# Patient Record
Sex: FEMALE | Race: BLACK OR AFRICAN AMERICAN | NOT HISPANIC OR LATINO | URBAN - METROPOLITAN AREA
[De-identification: names, ages, dates, MRNs, and addresses within clinical notes are randomized per-mention and may not be internally consistent; named-entity substitution may affect disease eponyms.]

---

## 2021-07-01 ENCOUNTER — INPATIENT (INPATIENT)
Facility: HOSPITAL | Age: 71
LOS: 7 days | Discharge: HOME HEALTH | DRG: 191 | End: 2021-07-09
Attending: INTERNAL MEDICINE | Admitting: INTERNAL MEDICINE
Payer: MEDICARE

## 2021-07-01 VITALS
RESPIRATION RATE: 18 BRPM | DIASTOLIC BLOOD PRESSURE: 78 MMHG | HEIGHT: 66 IN | WEIGHT: 108.03 LBS | TEMPERATURE: 99 F | SYSTOLIC BLOOD PRESSURE: 142 MMHG | HEART RATE: 105 BPM | OXYGEN SATURATION: 97 %

## 2021-07-01 DIAGNOSIS — J43.9 EMPHYSEMA, UNSPECIFIED: ICD-10-CM

## 2021-07-01 DIAGNOSIS — J44.1 CHRONIC OBSTRUCTIVE PULMONARY DISEASE WITH (ACUTE) EXACERBATION: ICD-10-CM

## 2021-07-01 DIAGNOSIS — Z29.9 ENCOUNTER FOR PROPHYLACTIC MEASURES, UNSPECIFIED: ICD-10-CM

## 2021-07-01 DIAGNOSIS — R60.0 LOCALIZED EDEMA: ICD-10-CM

## 2021-07-01 DIAGNOSIS — R25.1 TREMOR, UNSPECIFIED: ICD-10-CM

## 2021-07-01 DIAGNOSIS — R07.9 CHEST PAIN, UNSPECIFIED: ICD-10-CM

## 2021-07-01 DIAGNOSIS — R91.1 SOLITARY PULMONARY NODULE: ICD-10-CM

## 2021-07-01 DIAGNOSIS — R07.1 CHEST PAIN ON BREATHING: ICD-10-CM

## 2021-07-01 LAB
ALBUMIN SERPL ELPH-MCNC: 4.4 G/DL — SIGNIFICANT CHANGE UP (ref 3.3–5)
ALP SERPL-CCNC: 132 U/L — HIGH (ref 40–120)
ALT FLD-CCNC: 75 U/L — HIGH (ref 10–45)
ANION GAP SERPL CALC-SCNC: 12 MMOL/L — SIGNIFICANT CHANGE UP (ref 5–17)
AST SERPL-CCNC: 66 U/L — HIGH (ref 10–40)
BASE EXCESS BLDA CALC-SCNC: -1.3 MMOL/L — SIGNIFICANT CHANGE UP (ref -2–2)
BASE EXCESS BLDV CALC-SCNC: 0.4 MMOL/L — SIGNIFICANT CHANGE UP (ref -2–2)
BASE EXCESS BLDV CALC-SCNC: 1.8 MMOL/L — SIGNIFICANT CHANGE UP (ref -2–2)
BASOPHILS # BLD AUTO: 0.02 K/UL — SIGNIFICANT CHANGE UP (ref 0–0.2)
BASOPHILS NFR BLD AUTO: 0.3 % — SIGNIFICANT CHANGE UP (ref 0–2)
BILIRUB SERPL-MCNC: 0.6 MG/DL — SIGNIFICANT CHANGE UP (ref 0.2–1.2)
BUN SERPL-MCNC: 9 MG/DL — SIGNIFICANT CHANGE UP (ref 7–23)
CA-I SERPL-SCNC: 1.24 MMOL/L — SIGNIFICANT CHANGE UP (ref 1.12–1.3)
CA-I SERPL-SCNC: 1.31 MMOL/L — HIGH (ref 1.12–1.3)
CALCIUM SERPL-MCNC: 10 MG/DL — SIGNIFICANT CHANGE UP (ref 8.4–10.5)
CHLORIDE BLDV-SCNC: 106 MMOL/L — SIGNIFICANT CHANGE UP (ref 96–108)
CHLORIDE BLDV-SCNC: 110 MMOL/L — HIGH (ref 96–108)
CHLORIDE SERPL-SCNC: 101 MMOL/L — SIGNIFICANT CHANGE UP (ref 96–108)
CO2 BLDA-SCNC: 26 MMOL/L — SIGNIFICANT CHANGE UP (ref 22–30)
CO2 BLDV-SCNC: 29 MMOL/L — SIGNIFICANT CHANGE UP (ref 22–30)
CO2 BLDV-SCNC: 32 MMOL/L — HIGH (ref 22–30)
CO2 SERPL-SCNC: 26 MMOL/L — SIGNIFICANT CHANGE UP (ref 22–31)
CREAT SERPL-MCNC: 0.67 MG/DL — SIGNIFICANT CHANGE UP (ref 0.5–1.3)
EOSINOPHIL # BLD AUTO: 0.09 K/UL — SIGNIFICANT CHANGE UP (ref 0–0.5)
EOSINOPHIL NFR BLD AUTO: 1.2 % — SIGNIFICANT CHANGE UP (ref 0–6)
GAS PNL BLDV: 140 MMOL/L — SIGNIFICANT CHANGE UP (ref 135–145)
GAS PNL BLDV: 143 MMOL/L — SIGNIFICANT CHANGE UP (ref 135–145)
GAS PNL BLDV: SIGNIFICANT CHANGE UP
GAS PNL BLDV: SIGNIFICANT CHANGE UP
GLUCOSE BLDV-MCNC: 133 MG/DL — HIGH (ref 70–99)
GLUCOSE BLDV-MCNC: 142 MG/DL — HIGH (ref 70–99)
GLUCOSE SERPL-MCNC: 119 MG/DL — HIGH (ref 70–99)
HCO3 BLDA-SCNC: 24 MMOL/L — SIGNIFICANT CHANGE UP (ref 21–29)
HCO3 BLDV-SCNC: 27 MMOL/L — SIGNIFICANT CHANGE UP (ref 21–29)
HCO3 BLDV-SCNC: 30 MMOL/L — HIGH (ref 21–29)
HCT VFR BLD CALC: 44.3 % — SIGNIFICANT CHANGE UP (ref 34.5–45)
HCT VFR BLDA CALC: 39 % — SIGNIFICANT CHANGE UP (ref 39–50)
HCT VFR BLDA CALC: 48 % — SIGNIFICANT CHANGE UP (ref 39–50)
HGB BLD CALC-MCNC: 12.8 G/DL — SIGNIFICANT CHANGE UP (ref 11.5–15.5)
HGB BLD CALC-MCNC: 15.5 G/DL — SIGNIFICANT CHANGE UP (ref 11.5–15.5)
HGB BLD-MCNC: 13.8 G/DL — SIGNIFICANT CHANGE UP (ref 11.5–15.5)
IMM GRANULOCYTES NFR BLD AUTO: 0.1 % — SIGNIFICANT CHANGE UP (ref 0–1.5)
LACTATE BLDV-MCNC: 1.4 MMOL/L — SIGNIFICANT CHANGE UP (ref 0.7–2)
LACTATE BLDV-MCNC: 3.8 MMOL/L — HIGH (ref 0.7–2)
LYMPHOCYTES # BLD AUTO: 2.04 K/UL — SIGNIFICANT CHANGE UP (ref 1–3.3)
LYMPHOCYTES # BLD AUTO: 26.9 % — SIGNIFICANT CHANGE UP (ref 13–44)
MCHC RBC-ENTMCNC: 31.2 GM/DL — LOW (ref 32–36)
MCHC RBC-ENTMCNC: 31.7 PG — SIGNIFICANT CHANGE UP (ref 27–34)
MCV RBC AUTO: 101.8 FL — HIGH (ref 80–100)
MONOCYTES # BLD AUTO: 0.58 K/UL — SIGNIFICANT CHANGE UP (ref 0–0.9)
MONOCYTES NFR BLD AUTO: 7.7 % — SIGNIFICANT CHANGE UP (ref 2–14)
NEUTROPHILS # BLD AUTO: 4.84 K/UL — SIGNIFICANT CHANGE UP (ref 1.8–7.4)
NEUTROPHILS NFR BLD AUTO: 63.8 % — SIGNIFICANT CHANGE UP (ref 43–77)
NRBC # BLD: 0 /100 WBCS — SIGNIFICANT CHANGE UP (ref 0–0)
PCO2 BLDA: 45 MMHG — SIGNIFICANT CHANGE UP (ref 32–46)
PCO2 BLDV: 54 MMHG — HIGH (ref 35–50)
PCO2 BLDV: 67 MMHG — HIGH (ref 35–50)
PH BLDA: 7.34 — LOW (ref 7.35–7.45)
PH BLDV: 7.28 — LOW (ref 7.35–7.45)
PH BLDV: 7.32 — LOW (ref 7.35–7.45)
PLATELET # BLD AUTO: 219 K/UL — SIGNIFICANT CHANGE UP (ref 150–400)
PO2 BLDA: 121 MMHG — HIGH (ref 74–108)
PO2 BLDV: 22 MMHG — LOW (ref 25–45)
PO2 BLDV: 48 MMHG — HIGH (ref 25–45)
POTASSIUM BLDV-SCNC: 4 MMOL/L — SIGNIFICANT CHANGE UP (ref 3.5–5.3)
POTASSIUM BLDV-SCNC: 4 MMOL/L — SIGNIFICANT CHANGE UP (ref 3.5–5.3)
POTASSIUM SERPL-MCNC: 4.2 MMOL/L — SIGNIFICANT CHANGE UP (ref 3.5–5.3)
POTASSIUM SERPL-SCNC: 4.2 MMOL/L — SIGNIFICANT CHANGE UP (ref 3.5–5.3)
PROT SERPL-MCNC: 10.1 G/DL — HIGH (ref 6–8.3)
RAPID RVP RESULT: SIGNIFICANT CHANGE UP
RBC # BLD: 4.35 M/UL — SIGNIFICANT CHANGE UP (ref 3.8–5.2)
RBC # FLD: 13.1 % — SIGNIFICANT CHANGE UP (ref 10.3–14.5)
SAO2 % BLDA: 98 % — HIGH (ref 92–96)
SAO2 % BLDV: 23 % — LOW (ref 67–88)
SAO2 % BLDV: 80 % — SIGNIFICANT CHANGE UP (ref 67–88)
SARS-COV-2 RNA SPEC QL NAA+PROBE: SIGNIFICANT CHANGE UP
SARS-COV-2 RNA SPEC QL NAA+PROBE: SIGNIFICANT CHANGE UP
SODIUM SERPL-SCNC: 144 MMOL/L — SIGNIFICANT CHANGE UP (ref 135–145)
WBC # BLD: 7.58 K/UL — SIGNIFICANT CHANGE UP (ref 3.8–10.5)
WBC # FLD AUTO: 7.58 K/UL — SIGNIFICANT CHANGE UP (ref 3.8–10.5)

## 2021-07-01 PROCEDURE — 99285 EMERGENCY DEPT VISIT HI MDM: CPT | Mod: 25

## 2021-07-01 PROCEDURE — 99223 1ST HOSP IP/OBS HIGH 75: CPT

## 2021-07-01 PROCEDURE — 71275 CT ANGIOGRAPHY CHEST: CPT | Mod: 26

## 2021-07-01 PROCEDURE — 93010 ELECTROCARDIOGRAM REPORT: CPT

## 2021-07-01 PROCEDURE — 71045 X-RAY EXAM CHEST 1 VIEW: CPT | Mod: 26

## 2021-07-01 RX ORDER — IPRATROPIUM/ALBUTEROL SULFATE 18-103MCG
3 AEROSOL WITH ADAPTER (GRAM) INHALATION EVERY 6 HOURS
Refills: 0 | Status: DISCONTINUED | OUTPATIENT
Start: 2021-07-01 | End: 2021-07-09

## 2021-07-01 RX ORDER — BUDESONIDE AND FORMOTEROL FUMARATE DIHYDRATE 160; 4.5 UG/1; UG/1
2 AEROSOL RESPIRATORY (INHALATION)
Refills: 0 | Status: DISCONTINUED | OUTPATIENT
Start: 2021-07-01 | End: 2021-07-09

## 2021-07-01 RX ORDER — ENOXAPARIN SODIUM 100 MG/ML
40 INJECTION SUBCUTANEOUS DAILY
Refills: 0 | Status: DISCONTINUED | OUTPATIENT
Start: 2021-07-01 | End: 2021-07-09

## 2021-07-01 RX ORDER — TIOTROPIUM BROMIDE 18 UG/1
1 CAPSULE ORAL; RESPIRATORY (INHALATION) DAILY
Refills: 0 | Status: DISCONTINUED | OUTPATIENT
Start: 2021-07-01 | End: 2021-07-09

## 2021-07-01 RX ORDER — ALBUTEROL 90 UG/1
3 AEROSOL, METERED ORAL
Qty: 0 | Refills: 0 | DISCHARGE

## 2021-07-01 RX ORDER — SODIUM CHLORIDE 9 MG/ML
1000 INJECTION INTRAMUSCULAR; INTRAVENOUS; SUBCUTANEOUS ONCE
Refills: 0 | Status: COMPLETED | OUTPATIENT
Start: 2021-07-01 | End: 2021-07-01

## 2021-07-01 RX ORDER — CEFEPIME 1 G/1
1000 INJECTION, POWDER, FOR SOLUTION INTRAMUSCULAR; INTRAVENOUS ONCE
Refills: 0 | Status: COMPLETED | OUTPATIENT
Start: 2021-07-01 | End: 2021-07-01

## 2021-07-01 RX ADMIN — SODIUM CHLORIDE 1000 MILLILITER(S): 9 INJECTION INTRAMUSCULAR; INTRAVENOUS; SUBCUTANEOUS at 12:49

## 2021-07-01 RX ADMIN — BUDESONIDE AND FORMOTEROL FUMARATE DIHYDRATE 2 PUFF(S): 160; 4.5 AEROSOL RESPIRATORY (INHALATION) at 17:34

## 2021-07-01 RX ADMIN — CEFEPIME 100 MILLIGRAM(S): 1 INJECTION, POWDER, FOR SOLUTION INTRAMUSCULAR; INTRAVENOUS at 13:52

## 2021-07-01 RX ADMIN — Medication 3 MILLILITER(S): at 17:34

## 2021-07-01 RX ADMIN — SODIUM CHLORIDE 1000 MILLILITER(S): 9 INJECTION INTRAMUSCULAR; INTRAVENOUS; SUBCUTANEOUS at 13:49

## 2021-07-01 RX ADMIN — CEFEPIME 1000 MILLIGRAM(S): 1 INJECTION, POWDER, FOR SOLUTION INTRAMUSCULAR; INTRAVENOUS at 14:22

## 2021-07-01 RX ADMIN — Medication 125 MILLIGRAM(S): at 13:52

## 2021-07-01 NOTE — ED ADULT NURSE NOTE - OBJECTIVE STATEMENT
72 y/o female brought in by EMS with chest pain, A & Ox3. Pt verbalizes "waking up from sleep with chest tightness spreading to left side of chest." Pt states she took baby aspirin chewable at onset of symptoms with minimal relief. Pt states she has been feeling more lethargic and is having intermittent chills. Pt denies palpitations, diaphoresis, n/v/d, dizziness, changes in vision, syncope, or changes in appetite. Upon assessment, A&Ox3 gross neuro intact, lungs cta bilaterally, tachypneic and dyspneic on exertion, abdomen soft nontender, nondistended.  Pt. with past medical history of COPD, asthma, former smoker. Pt on 3L oxygen nasal canula at home. Pt. placed on cardiac monitor, EKG Completed, labs drawn. safety checks completed, side rails in place, call bell within reach. Awaiting results. 72 y/o female brought in by EMS with chest pain, A & Ox3. Pt verbalizes "waking up from sleep with chest tightness spreading to left side of chest." Pt states she took baby aspirin chewable at onset of symptoms with minimal relief. Pt states she has been feeling more lethargic, having intermittent chills, and chest pressure worsens with exertion. Pt denies palpitations, diaphoresis, n/v/d, dizziness, changes in vision, syncope, or changes in appetite. Upon assessment, A&Ox3 gross neuro intact, lungs cta bilaterally, tachypneic and dyspneic on exertion, abdomen soft nontender, nondistended.  Pt. with past medical history of COPD, asthma, former smoker. Pt on 3L oxygen nasal canula at home. Pt. placed on cardiac monitor, EKG Completed, labs drawn. safety checks completed, side rails in place, call bell within reach. Awaiting results.

## 2021-07-01 NOTE — CONSULT NOTE ADULT - SUBJECTIVE AND OBJECTIVE BOX
PULMONARY CONSULT    HPI: 69 y/o F with PMH emphysema/COPD, asthma (on 3LNC). Presents with sharp L sided CP worse with inhalation and worsening SOB. States has not felt well for the past week with increased temps.       PAST MEDICAL & SURGICAL HISTORY:  Personal History of Chronic Obstructive Pulmonary Disease   Personal History of Asthma   Personal History of Pneumonia   No significant past surgical history    Allergies  aspirin (Unknown)  eggs (Unknown)    FAMILY HISTORY:  No pertinent family history in first degree relatives    Social history:     Review of Systems:  CONSTITUTIONAL: Per above   EYES: No eye pain, visual disturbances, or discharge  ENMT:  No difficulty hearing, tinnitus, vertigo; No sinus or throat pain  NECK: No pain or stiffness  RESPIRATORY: Per above  CARDIOVASCULAR: No chest pain, palpitations, dizziness, or leg swelling  GASTROINTESTINAL: No abdominal or epigastric pain. No nausea, vomiting, or hematemesis; No diarrhea or constipation. No melena or hematochezia.  GENITOURINARY: No dysuria, frequency, hematuria, or incontinence  NEUROLOGICAL: No headaches, memory loss, loss of strength, numbness, or tremors  SKIN: No itching, burning, rashes, or lesions   MUSCULOSKELETAL: No joint pain or swelling; No muscle, back, or extremity pain  PSYCHIATRIC: No depression, anxiety, mood swings, or difficulty sleeping    Medications:  MEDICATIONS  (STANDING):      Vital Signs Last 24 Hrs  T(C): 37.1 (01 Jul 2021 12:16), Max: 37.1 (01 Jul 2021 11:48)  T(F): 98.7 (01 Jul 2021 12:16), Max: 98.7 (01 Jul 2021 11:48)  HR: 109 (01 Jul 2021 12:50) (105 - 109)  BP: 155/92 (01 Jul 2021 12:50) (142/78 - 172/104)  BP(mean): 109 (01 Jul 2021 12:50) (109 - 109)  RR: 26 (01 Jul 2021 12:16) (18 - 26)  SpO2: 97% (01 Jul 2021 12:16) (97% - 97%)      VBG pH 7.28 07-01 @ 12:37  VBG pCO2 67 07-01 @ 12:37  VBG O2 sat 23 07-01 @ 12:37  VBG lactate 3.8 07-01 @ 12:37      LABS:                        13.8   7.58  )-----------( 219      ( 01 Jul 2021 12:36 )             44.3     07-01    144  |  101  |  9   ----------------------------<  119<H>  4.2   |  26  |  0.67    Ca    10.0      01 Jul 2021 12:36    TPro  10.1<H>  /  Alb  4.4  /  TBili  0.6  /  DBili  x   /  AST  66<H>  /  ALT  75<H>  /  AlkPhos  132<H>  07-01          Serum Pro-Brain Natriuretic Peptide: 51 pg/mL (07-01-21 @ 12:36)        Physical Examination:    General: No acute distress.      HEENT: Pupils equal, reactive to light.  Symmetric.    PULM: Clear to auscultation bilaterally, no significant sputum production    CVS: S1, S2    ABD: Soft, nondistended, nontender, normoactive bowel sounds, no masses    EXT: No edema, nontender    SKIN: Warm and well perfused, no rashes noted.    NEURO: Alert, oriented, interactive, nonfocal      RADIOLOGY REVIEWED  CXR: BLANCA scarring  emphysema  PULMONARY CONSULT    HPI: 71 y/o F with PMH emphysema/COPD, asthma (on 3LNC). Presents with sharp L sided CP worse with inhalation and worsening SOB. States has not felt well for the past week, endorses L knee swelling, which has now improved. CXR with no clear infiltrate. S/p solumedrol 125mg IVP x1 in ED. No wheezing on exam, at baseline O2 requirements. Denies fevers.       PAST MEDICAL & SURGICAL HISTORY:  Personal History of Chronic Obstructive Pulmonary Disease   Personal History of Asthma   Personal History of Pneumonia   No significant past surgical history    Allergies  aspirin (Unknown)  eggs (Unknown)    FAMILY HISTORY:  No pertinent family history in first degree relatives    Social history:     Review of Systems:  CONSTITUTIONAL: Per above   EYES: No eye pain, visual disturbances, or discharge  ENMT:  No difficulty hearing, tinnitus, vertigo; No sinus or throat pain  NECK: No pain or stiffness  RESPIRATORY: Per above  CARDIOVASCULAR: Per above   GASTROINTESTINAL: No abdominal or epigastric pain. No nausea, vomiting, or hematemesis; No diarrhea or constipation. No melena or hematochezia.  GENITOURINARY: No dysuria, frequency, hematuria, or incontinence  NEUROLOGICAL: No headaches, memory loss, loss of strength, numbness, or tremors  SKIN: No itching, burning, rashes, or lesions   MUSCULOSKELETAL: No joint pain or swelling; No muscle, back, or extremity pain  PSYCHIATRIC: No depression, anxiety, mood swings, or difficulty sleeping    Medications:  MEDICATIONS  (STANDING):      Vital Signs Last 24 Hrs  T(C): 37.1 (01 Jul 2021 12:16), Max: 37.1 (01 Jul 2021 11:48)  T(F): 98.7 (01 Jul 2021 12:16), Max: 98.7 (01 Jul 2021 11:48)  HR: 109 (01 Jul 2021 12:50) (105 - 109)  BP: 155/92 (01 Jul 2021 12:50) (142/78 - 172/104)  BP(mean): 109 (01 Jul 2021 12:50) (109 - 109)  RR: 26 (01 Jul 2021 12:16) (18 - 26)  SpO2: 97% (01 Jul 2021 12:16) (97% - 97%)      VBG pH 7.28 07-01 @ 12:37  VBG pCO2 67 07-01 @ 12:37  VBG O2 sat 23 07-01 @ 12:37  VBG lactate 3.8 07-01 @ 12:37      LABS:                        13.8   7.58  )-----------( 219      ( 01 Jul 2021 12:36 )             44.3     07-01    144  |  101  |  9   ----------------------------<  119<H>  4.2   |  26  |  0.67    Ca    10.0      01 Jul 2021 12:36    TPro  10.1<H>  /  Alb  4.4  /  TBili  0.6  /  DBili  x   /  AST  66<H>  /  ALT  75<H>  /  AlkPhos  132<H>  07-01          Serum Pro-Brain Natriuretic Peptide: 51 pg/mL (07-01-21 @ 12:36)        Physical Examination:    General: No acute distress.      HEENT: Pupils equal, reactive to light.  Symmetric.    PULM: decreased BS    CVS: S1, S2    ABD: Soft, nondistended, nontender, normoactive bowel sounds, no masses    EXT: No edema, nontender    SKIN: Warm and well perfused, no rashes noted.    NEURO: Alert, oriented, interactive, nonfocal      RADIOLOGY REVIEWED  CXR: BLANCA scarring  emphysema

## 2021-07-01 NOTE — ED PROVIDER NOTE - PMH
Personal History of Asthma (ICD9 V12.69)    Personal History of Chronic Obstructive Pulmonary Disease (ICD9 V12.69)    Personal History of Pneumonia (ICD9 V12.61)

## 2021-07-01 NOTE — ED PROVIDER NOTE - ATTENDING CONTRIBUTION TO CARE
Private Physician Kevan Barber Not staff. Mingo Browning MD,  Pulmonary  [place]  70y feamle pmh copd/asthma, Former smoker, No dm,htn,hld,cad, pt comes to ed c/o cp/sob osnet today. Pain left side sharp. Pt has not felt well for past week with increased temps, NO abd pain, nvdc, fever and chills, Pt normally on 3lpm nc, has unspecific weight loss past week. PE Elderly female looking stated age thin/mild dyspenic. Heent normocephalic atraumatic neck supple chest clear anterior & posterior cv no rubs, gallops or murmurs abd soft +bs no mass guarding neruo gcs 15 speech fluent power 5/5 all extr  Georges Avalos MD, Facep

## 2021-07-01 NOTE — H&P ADULT - NSICDXPASTMEDICALHX_GEN_ALL_CORE_FT
PAST MEDICAL HISTORY:  Personal History of Asthma (ICD9 V12.69)     Personal History of Chronic Obstructive Pulmonary Disease (ICD9 V12.69)     Personal History of Pneumonia (ICD9 V12.61)

## 2021-07-01 NOTE — H&P ADULT - PROBLEM SELECTOR PLAN 1
- does not appear to be in exacerbation  - no wheezing on exam ; she does appear to be short of breath  -s/p solumedrol 125mg IVP x1 in ED. Would hold off on further steroids per Pul reccs  -c/w O2 2-3LNC (baseline 3LNC)  -Symbicort 160mcg/4.5mcg 2 puffs BID  -Spiriva 18mcg 1 capsule inhaled qd  -Duoneb q6h  -f/up ABG  - CTA  negative for PE but shows a new spiculated RUL mass - does not appear to be in exacerbation  - no wheezing on exam ; she does appear to be short of breath  -s/p solumedrol 125mg IVP x1 in ED. Would hold off on further steroids per Pulm reccs  -c/w O2 2-3LNC (baseline 3LNC)  -Symbicort 160mcg/4.5mcg 2 puffs BID  -Spiriva 18mcg 1 capsule inhaled qd  -Duoneb q6h  -f/up ABG  - CTA  negative for PE but shows a new spiculated RUL mass

## 2021-07-01 NOTE — ED PROVIDER NOTE - NS ED ROS FT
General: denies fever, chills  HENT: denies nasal congestion, rhinorrhea  Eyes: denies visual changes, blurred vision  CV: positive chest pain, no palpitations  Resp: positive difficulty breathing, cough  Abdominal: denies nausea, vomiting, diarrhea, abdominal pain  : denies urinary pain or discharge  MSK: denies muscle aches, leg swelling  Neuro: denies headaches, numbness, tingling  Skin: denies rashes, bruises

## 2021-07-01 NOTE — H&P ADULT - HISTORY OF PRESENT ILLNESS
70F with h/o emphysema/COPD, asthma (on home O2 3LNC) who presents with c/o sharp L sided CP worse with inhalation and worsening SOB. She states that she has not been feeling well  for the past week. She reports left knee swelling, which has now improved. She denies cough, wheezing, fever/chills.     ED COURSE  VS :  70F with h/o emphysema/COPD, asthma (on home O2 3LNC) who presents with c/o sharp L sided CP worse with inhalation and worsening SOB. She states that she has not been feeling well  for the past week. She reports left knee swelling, which has now improved. She denies cough, wheezing, fever/chills.     ED COURSE  VS : 142/78  105  18  o2 97%3L T 98.7F  Labs : no leukocytosis, bun/cr 9/0.67 /AST 66/ALT 75  Treatment ; Cefepime 1g IVPB  x 1, IVF NS 1L x 1, Solumedrol 125mg IVP x1 70F with h/o emphysema/COPD, Asthma (on home O2 3LNC) who presents with c/o sharp Left sided CP worse with inhalation and worsening SOB. She states that she has not been feeling well  for the past week. She reports left knee swelling, which has now improved. She denies cough, wheezing, fever/chills.     ED COURSE  VS : 142/78  105  18  o2 97%3L T 98.7F  Labs : no leukocytosis, bun/cr 9/0.67 /AST 66/ALT 75  Treatment ; Cefepime 1g IVPB  x 1, IVF NS 1L x 1, Solumedrol 125mg IVP x1 70F with h/o emphysema/COPD, Asthma (on home O2 3LNC) who presents with c/o sharp left sided CP worse with inhalation and worsening SOB. She states that she has not been feeling well  for the past week. She reports left knee swelling, which has now improved. She denies cough, wheezing, fever/chills.     ED COURSE  VS : 142/78  105  18  o2 97%3L T 98.7F  Labs : no leukocytosis, bun/cr 9/0.67 /AST 66/ALT 75  Treatment ; Cefepime 1g IVPB  x 1, IVF NS 1L x 1, Solumedrol 125mg IVP x1 70F with h/o emphysema/COPD, Asthma (on home O2 3LNC) who presents with c/o sharp left sided CP worse with inhalation and worsening SOB. She states that she has not been feeling well  for the past week. She reports left knee swelling, which has now improved. She denies cough, wheezing, fever/chills.     ED COURSE  VS : 142/78  105  18  O2 97%3L T 98.7F  Labs : no leukocytosis, bun/cr 9/0.67 /AST 66/ALT 75  Treatment ; Cefepime 1g IVPB  x 1, IVF NS 1L x 1, Solumedrol 125mg IVP x1

## 2021-07-01 NOTE — H&P ADULT - PROBLEM SELECTOR PLAN 3
- CTA shows new 2.1 x 1.2 cm spiculated nodule in the right upper lobe ; concerning for malignancy  - Pulm f/up for further work up re: biopsy etc

## 2021-07-01 NOTE — ED PROVIDER NOTE - OBJECTIVE STATEMENT
70y feamle pmh copd/asthma on 3L home O2 coming in w/ sharp left sided chest pain worse w/ inhalation and worsened SOB. Pt has not felt well for past week with increased temps. Was scheduled to see her pulmonologist  (Mingo Browning) today but instead came to the ED for pain.

## 2021-07-01 NOTE — CONSULT NOTE ADULT - ASSESSMENT
69 y/o F with PMH emphysema/COPD, asthma (on 3LNC). Presents with sharp L sided CP worse with inhalation and worsening SOB. States has not felt well for the past week, endorses L knee swelling, which has now improved. CXR with no clear infiltrate. S/p solumedrol 125mg IVP x1 in ED. No wheezing on exam, at baseline O2 requirements.

## 2021-07-01 NOTE — ED PROVIDER NOTE - CLINICAL SUMMARY MEDICAL DECISION MAKING FREE TEXT BOX
70y F pmh copd/asthma on 3L home O2 coming in w/ sharp left sided chest pain worse w/ inhalation and worsened SOB; likely COPD exacerbation vs pneumonia  Plan: labs + cxr; likely TBA

## 2021-07-01 NOTE — H&P ADULT - NSHPLABSRESULTS_GEN_ALL_CORE
Labs reviewed : no leukocytosis, elevated LFTs    CXR  personally reviewed : hyperinflated lungs, no effusions, no focal infilterate Labs reviewed : no leukocytosis, elevated LFTs    CXR  personally reviewed : hyperinflated lungs, no effusions, no focal infilterate    CTA chest : No pulmonary embolus.  New 2.1 x 1.2 cm spiculated nodule in the right upper lobe when compared with 2016 examination. This finding is indeterminate but concerning for malignancy given the concurrent finding of severe emphysema. No associated lymphadenopathy.

## 2021-07-01 NOTE — H&P ADULT - PROBLEM SELECTOR PLAN 2
- worse with inspiration  - CTA chest negative for PE but shows a new spiculated RUL mass ; concerning for malignancy  - f/up Pulm re: further work up  - will start Tylenol prn for pain

## 2021-07-01 NOTE — H&P ADULT - NSHPSOCIALHISTORY_GEN_ALL_CORE
Denies smoking, Etoh abuse, IVDU Denies smoking, Etoh abuse, IVDU  Quit smoking 2 years ago  Drinks Etoh occasionally

## 2021-07-01 NOTE — CONSULT NOTE ADULT - PROBLEM SELECTOR RECOMMENDATION 9
?exacerbation - not noted to be wheezing on admission or on exam, +SOB noted  -S/p solumedrol 125mg IVP x1 in ED. Would hold off on further steroids and re-evaluate tomorrow.   -C/w O2 2-3LNC (baseline 3LNC)  -Symbicort 160mcg/4.5mcg 2 puffs BID  -Spiriva 18mcg 1 capsule inhaled qd  -Duoneb q6h  -VBG noted, check ABG  -F/u CTA

## 2021-07-01 NOTE — H&P ADULT - PROBLEM SELECTOR PLAN 5
DVT ppx ; Lovenox SC - pt reports developing tremors about 1 month ago  - she was seen by Neurology ( Dr Jens Bustos - [phone])  - she is s/p open MRI Brain which she reports was unremarkable  - she reports occasional difficulty with writing and other ADLs on account of UE tremors  - would obtain Neurology consult in AM  - f/up with Dr Bustos's office in AM for MRI report  - PT consult

## 2021-07-01 NOTE — ED PEDIATRIC NURSE REASSESSMENT NOTE - NS ED NURSE REASSESS COMMENT FT2
pt resting comfortably on stretcher in [place] on oxygen. patient has no complaints at this time. repeat VBG drawn

## 2021-07-01 NOTE — H&P ADULT - PROBLEM SELECTOR PLAN 4
- reports having b/l LE edema up to the knees over the past week w/associated LE tingling  - now resolved  - f/up LE doppler

## 2021-07-01 NOTE — ED PROVIDER NOTE - PHYSICAL EXAMINATION
GENERAL: well appearing in no acute distress, non-toxic appearing  HEAD: normocephalic, atraumatic  HENT: airway intact; neck supple  EYES: normal conjunctiva  CARDIAC: regular rate and rhythm, normal S1S2, no appreciable murmurs, 2+ pulses in UE/LE b/l  PULM: normal breath sounds, clear to ascultation bilaterally, no rales, rhonchi, wheezing  GI: abdomen nondistended, soft, nontender, no guarding, rebound tenderness  NEURO: no focal motor or sensory deficits  MSK: no peripheral edema, no calf tenderness b/l  SKIN: well-perfused, extremities warm, no visible rashes  PSYCH: appropriate mood and affect

## 2021-07-01 NOTE — H&P ADULT - ASSESSMENT
70F with h/o emphysema/COPD, Asthma (on home O2 3LNC) who presents with c/o  Left sided CP worse with inhalation and worsening SOB.  70F with h/o emphysema/COPD, Asthma (on home O2 3LNC) who presents with c/o  Left sided CP worse with inhalation and worsening SOB. Found to have new lung mass on imaging.

## 2021-07-01 NOTE — ED ADULT TRIAGE NOTE - AS TEMP SITE
Bozena Rodriguez MD your patient has been admitted to the [facility] in [place] on Admit Date: 10/14/2020 Admit Time: 1625. Please contact [phone] with any questions. Thank you.     oral

## 2021-07-02 LAB
COVID-19 SPIKE DOMAIN AB INTERP: POSITIVE
COVID-19 SPIKE DOMAIN ANTIBODY RESULT: >250 U/ML — HIGH
HCV AB S/CO SERPL IA: 0.27 S/CO — SIGNIFICANT CHANGE UP (ref 0–0.99)
HCV AB SERPL-IMP: SIGNIFICANT CHANGE UP
SARS-COV-2 IGG+IGM SERPL QL IA: >250 U/ML — HIGH
SARS-COV-2 IGG+IGM SERPL QL IA: POSITIVE

## 2021-07-02 PROCEDURE — 93970 EXTREMITY STUDY: CPT | Mod: 26

## 2021-07-02 RX ADMIN — BUDESONIDE AND FORMOTEROL FUMARATE DIHYDRATE 2 PUFF(S): 160; 4.5 AEROSOL RESPIRATORY (INHALATION) at 06:30

## 2021-07-02 RX ADMIN — Medication 3 MILLILITER(S): at 11:37

## 2021-07-02 RX ADMIN — ENOXAPARIN SODIUM 40 MILLIGRAM(S): 100 INJECTION SUBCUTANEOUS at 11:38

## 2021-07-02 RX ADMIN — Medication 3 MILLILITER(S): at 17:36

## 2021-07-02 RX ADMIN — BUDESONIDE AND FORMOTEROL FUMARATE DIHYDRATE 2 PUFF(S): 160; 4.5 AEROSOL RESPIRATORY (INHALATION) at 17:37

## 2021-07-02 RX ADMIN — Medication 3 MILLILITER(S): at 23:45

## 2021-07-02 RX ADMIN — Medication 3 MILLILITER(S): at 00:13

## 2021-07-02 RX ADMIN — TIOTROPIUM BROMIDE 1 CAPSULE(S): 18 CAPSULE ORAL; RESPIRATORY (INHALATION) at 11:58

## 2021-07-02 RX ADMIN — Medication 3 MILLILITER(S): at 06:30

## 2021-07-02 NOTE — PATIENT PROFILE ADULT - PASTORAL.
Muscle Strain in the Extremities  A muscle strain is a stretching and tearing of muscle fibers. This causes pain, especially when you move that muscle. There may also be some swelling and bruising.  Home care    Keep the hurt area raised to reduce pain and swelling. This is especially important during the first 48 hours.    Apply an ice pack over the injured area for 15 to 20 minutes every 3 to 6 hours. You should do this for the first 24 to 48 hours. You can make an ice pack by filling a plastic bag that seals at the top with ice cubes and then wrapping it with a thin towel. Be careful not to injure your skin with the ice treatments. Ice should never be applied directly to skin. Continue the use of ice packs for relief of pain and swelling as needed. After 48 hours, apply heat (warm shower or warm bath) for 15 to 20 minutes several times a day, or alternate ice and heat.    You may use over-the-counter pain medicine to control pain, unless another medicine was prescribed. If you have chronic liver or kidney disease or ever had a stomach ulcer or GI bleeding, talk with your healthcare provider before using these medicines.    For leg strains: If crutches have been recommended, don t put full weight on the hurt leg until you can do so without pain. You can return to sports when you are able to hop and run on the injured leg without pain.  Follow-up care  Follow up with your healthcare provider, or as advised.  When to seek medical advice  Call your healthcare provider right away if any of these occur:    The toes of the injured leg become swollen, cold, blue, numb, or tingly    Pain or swelling increases  Date Last Reviewed: 11/19/2015 2000-2017 [employer]. [address] [place] [address], [place] [address]. All rights reserved. This information is not intended as a substitute for professional medical care. Always follow your healthcare professional's instructions.        
chaplaincy/clergy/

## 2021-07-02 NOTE — PHYSICAL THERAPY INITIAL EVALUATION ADULT - PLANNED THERAPY INTERVENTIONS, PT EVAL
1. GOAL: Pt will be able to negotiate 5 steps +HR independently with reciprocal pattern in 3 weeks./bed mobility training/gait training/strengthening/transfer training

## 2021-07-02 NOTE — PHYSICAL THERAPY INITIAL EVALUATION ADULT - ADDITIONAL COMMENTS
CT Chest: No pulmonary embolus. Pt reports living in a private home with her son. There are 4-5 steps to enter, none inside. PTA, pt was independent with all functional mobility & ADL's without the use of an AD.    CT Chest: No pulmonary embolus.

## 2021-07-02 NOTE — PHYSICAL THERAPY INITIAL EVALUATION ADULT - STRENGTHENING, PT EVAL
5. GOAL: Pt will increase strength in BLE by at least 1/2 grade within 3 weeks to improve functional mobility.

## 2021-07-02 NOTE — PROGRESS NOTE ADULT - PROBLEM SELECTOR PLAN 1
-Breathing comfortably on O2 3LNC (baseline @ home), endorses WILKINSON  -S/p solumedrol 125mg IVP x1 in ED.  -Does not appear exacerbated, no need for additional steroids at this time  -C/w Symbicort 160mcg/4.5mcg 2 puffs BID  -C/w Spiriva 18mcg 1 capsule inhaled qd  -C/w Duoneb q6h  -ABG noted, 7.34/45/121/24  -CTA chest with severe emphysema

## 2021-07-02 NOTE — PROGRESS NOTE ADULT - PROBLEM SELECTOR PLAN 1
- does not appear to be in exacerbation  - no wheezing on exam ; she does appear to be short of breath  -s/p solumedrol 125mg IVP x1 in ED. Would hold off on further steroids per Pulm reccs  -c/w O2 2-3LNC (baseline 3LNC)  -Symbicort 160mcg/4.5mcg 2 puffs BID  -Spiriva 18mcg 1 capsule inhaled qd  -Duoneb q6h  -f/up ABG  - CTA  negative for PE but shows a new spiculated RUL mass

## 2021-07-02 NOTE — PROGRESS NOTE ADULT - PROBLEM SELECTOR PLAN 3
worse with inspiration  -CTA chest with no PE   -Consider cards consult worse with inspiration, improved  -CTA chest with no PE

## 2021-07-02 NOTE — PATIENT PROFILE ADULT - DEAF OR HARD OF HEARING?
Subjective   Gali Sutton is a 71 y.o. female.     Chief Complaint   Patient presents with   • Hypertension     follow-up   • mixed Hyperlipidemia     follow-up   • Diabetes Mellitus Type 2     follow-up   • Medication refills     patient needs refills on Crestor, Losartan, and Test Strips sent to [facility], [place]       History of Present Illness   HPI:   Patient is here to follow up on the blood pressure and cholesterol and sugar , the patient is doing better with the blood pressure , the medications are working and the patient has had no side effects and takes it as prescribed. The patient also needs refills on medications . The patient is also here to follow up on the cholesterol and is trying to follow a diet. Patient had labwork done and would like that to be reviewed,   she declines a repeat colonoscopy, she sees her eye doctor in [place] annually for a diabetic eye exam .  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    Review of Systems   Constitutional: Negative for appetite change, fatigue and fever.   HENT: Negative for congestion, ear discharge, ear pain, sinus pressure and sore throat.    Eyes: Negative for pain and discharge.   Respiratory: Negative for cough, chest tightness, shortness of breath and wheezing.    Cardiovascular: Negative for chest pain, palpitations and leg swelling.   Gastrointestinal: Negative for abdominal pain, blood in stool, constipation, diarrhea and nausea.   Endocrine: Negative for cold intolerance and heat intolerance.   Genitourinary: Negative for dysuria, flank pain and frequency.   Musculoskeletal: Negative for back pain and joint swelling.   Skin: Negative for color change.   Allergic/Immunologic: Negative  "for environmental allergies and food allergies.   Neurological: Negative for dizziness, weakness, numbness and headaches.   Hematological: Negative for adenopathy. Does not bruise/bleed easily.   Psychiatric/Behavioral: Negative for behavioral problems and dysphoric mood. The patient is not nervous/anxious.          Current Outpatient Prescriptions:   •  glucose blood (FREESTYLE LITE) test strip, 1 each by Other route Daily. Use as instructed, Disp: 100 each, Rfl: 6  •  losartan (COZAAR) 25 MG tablet, Take 1 tablet by mouth Daily., Disp: 90 tablet, Rfl: 3  •  metFORMIN (GLUCOPHAGE) 500 MG tablet, Take 1 tablet by mouth 2 (Two) Times a Day With Meals., Disp: 60 tablet, Rfl: 11  •  rosuvastatin (CRESTOR) 5 MG tablet, Take 1 tablet by mouth Every Night., Disp: 90 tablet, Rfl: 3  •  aspirin 81 MG tablet, Take 81 mg by mouth Daily., Disp: , Rfl:   •  calcium (OS-ARSLAN) 600 MG tablet, Take 600 mg by mouth Daily., Disp: , Rfl:   •  glucose monitor monitoring kit, 1 each Daily., Disp: 1 each, Rfl: 0    Current Facility-Administered Medications:   •  cyanocobalamin injection 1,000 mcg, 1,000 mcg, Intramuscular, Q28 Days, Mera Garcia MD, 1,000 mcg at 03/20/17 1218    Objective     Blood pressure 128/55, pulse 63, temperature 97.7 °F (36.5 °C), temperature source Temporal Artery , resp. rate 16, height 165.1 cm (65\"), weight 77 kg (169 lb 11.2 oz), SpO2 99 %.    Physical Exam   Constitutional: She is oriented to person, place, and time. She appears well-developed and well-nourished. No distress.   HENT:   Head: Normocephalic and atraumatic.   Right Ear: External ear normal.   Left Ear: External ear normal.   Nose: Nose normal.   Mouth/Throat: Oropharynx is clear and moist.   Eyes: Conjunctivae and EOM are normal. Pupils are equal, round, and reactive to light.   Neck: Neck supple. No thyromegaly present.   Cardiovascular: Normal rate, regular rhythm and normal heart sounds.    Pulmonary/Chest: Effort normal and breath sounds " normal. No respiratory distress.   Abdominal: Soft. Bowel sounds are normal. She exhibits no distension. There is no tenderness. There is no rebound.   Musculoskeletal: Normal range of motion. She exhibits no edema.   Lymphadenopathy:     She has no cervical adenopathy.   Neurological: She is alert and oriented to person, place, and time.   No gross motor or sensory deficits   Skin: Skin is warm. She is not diaphoretic.   Psychiatric: She has a normal mood and affect.   Nursing note and vitals reviewed.      Results for orders placed or performed in visit on 12/14/17   Comprehensive Metabolic Panel   Result Value Ref Range    Glucose 118 (H) 74 - 98 mg/dL    BUN 18 7 - 20 mg/dL    Creatinine 0.90 0.60 - 1.30 mg/dL    eGFR Non African Am 62 >60 mL/min/1.73    eGFR African Am 75 >60 mL/min/1.73    BUN/Creatinine Ratio 20.0 7.1 - 23.5    Sodium 145 137 - 145 mmol/L    Potassium 4.5 3.5 - 5.1 mmol/L    Chloride 107 98 - 107 mmol/L    Total CO2 27.0 26.0 - 30.0 mmol/L    Calcium 10.0 8.4 - 10.2 mg/dL    Total Protein 7.1 6.3 - 8.2 g/dL    Albumin 4.70 3.50 - 5.00 g/dL    Globulin 2.4 gm/dL    A/G Ratio 2.0 1.0 - 2.0 g/dL    Total Bilirubin 0.6 0.2 - 1.3 mg/dL    Alkaline Phosphatase 68 38 - 126 U/L    AST (SGOT) 22 15 - 46 U/L    ALT (SGPT) 34 13 - 69 U/L   Lipid Panel   Result Value Ref Range    Total Cholesterol 211 (H) 0 - 199 mg/dL    Triglycerides 211 (H) <150 mg/dL    HDL Cholesterol 42 40 - 60 mg/dL    VLDL Cholesterol 42.2 mg/dL    LDL Cholesterol  127 (H) 0 - 99 mg/dL   Microalbumin / Creatinine Urine Ratio   Result Value Ref Range    Creatinine, Urine 204.8 Not Estab. mg/dL    Microalbumin, Urine 214.9 Not Estab. ug/mL    Microalbumin/Creatinine Ratio 104.9 (H) 0.0 - 30.0 mg/g creat   Hemoglobin A1c   Result Value Ref Range    Hemoglobin A1C 6.30 %   Vitamin B12   Result Value Ref Range    Vitamin B-12 408 239 - 931 pg/mL   Ambig Abbrev CMP14 Default   Result Value Ref Range    Ambig Abbrev CMP14 Default  Comment    Ambig Abbrev LP Default   Result Value Ref Range    Ambig Abbrev LP Default Comment    CBC & Differential   Result Value Ref Range    WBC 5.78 4.80 - 10.80 10*3/mm3    RBC 4.51 4.20 - 5.40 10*6/mm3    Hemoglobin 13.4 12.0 - 16.0 g/dL    Hematocrit 41.5 37.0 - 47.0 %    MCV 92.0 81.0 - 99.0 fL    MCH 29.7 27.0 - 31.0 pg    MCHC 32.3 30.0 - 37.0 g/dL    RDW 12.9 11.5 - 14.5 %    Platelets 255 130 - 400 10*3/mm3    Neutrophil Rel % 55.2 37.0 - 80.0 %    Lymphocyte Rel % 32.5 10.0 - 50.0 %    Monocyte Rel % 10.0 0.0 - 12.0 %    Eosinophil Rel % 1.4 0.0 - 7.0 %    Basophil Rel % 0.7 0.0 - 2.5 %    Neutrophils Absolute 3.19 2.00 - 6.90 10*3/mm3    Lymphocytes Absolute 1.88 0.60 - 3.40 10*3/mm3    Monocytes Absolute 0.58 0.00 - 0.90 10*3/mm3    Eosinophils Absolute 0.08 0.00 - 0.70 10*3/mm3    Basophils Absolute 0.04 0.00 - 0.20 10*3/mm3    Immature Granulocyte Rel % 0.2 0.0 - 0.6 %    Immature Grans Absolute 0.01 0.00 - 0.06 10*3/mm3    nRBC 0.0 0.0 - 0.0 /100 WBC         Assessment/Plan   Gali was seen today for hypertension, mixed hyperlipidemia, diabetes mellitus type 2 and medication refills.    Diagnoses and all orders for this visit:    Benign essential hypertension    Pure hypercholesterolemia  -     CBC & Differential  -     Comprehensive Metabolic Panel  -     Lipid Panel    Controlled type 2 diabetes mellitus without complication, without long-term current use of insulin  -     Hemoglobin A1c  -     glucose blood (FREESTYLE LITE) test strip; 1 each by Other route Daily. Use as instructed  -     glucose monitor monitoring kit; 1 each Daily.    Other orders  -     losartan (COZAAR) 25 MG tablet; Take 1 tablet by mouth Daily.  -     rosuvastatin (CRESTOR) 5 MG tablet; Take 1 tablet by mouth Every Night.  -     metFORMIN (GLUCOPHAGE) 500 MG tablet; Take 1 tablet by mouth 2 (Two) Times a Day With Meals.    Discussion Summary:   1.benign essential hypertension: Will continue losartan , low-sodium diet  advise.  2.diabetes mellitus: Labwork reviewed with patient, 1800-calorie ADA diet, advised, fasting blood sugar 118 , A1c 6.3 , urine microalbumin 49.2. Continue metformin 500mg bid  3.mixed hyperlipidemia: Will continue with  crestor  5mg qd , low cholesterol diet advise. Total cholesterol 211 triglyceride 211 . HDL 42, LDL  127             Mera Garcia MD   no

## 2021-07-03 RX ADMIN — Medication 3 MILLILITER(S): at 23:21

## 2021-07-03 RX ADMIN — BUDESONIDE AND FORMOTEROL FUMARATE DIHYDRATE 2 PUFF(S): 160; 4.5 AEROSOL RESPIRATORY (INHALATION) at 06:58

## 2021-07-03 RX ADMIN — Medication 3 MILLILITER(S): at 17:20

## 2021-07-03 RX ADMIN — BUDESONIDE AND FORMOTEROL FUMARATE DIHYDRATE 2 PUFF(S): 160; 4.5 AEROSOL RESPIRATORY (INHALATION) at 17:20

## 2021-07-03 RX ADMIN — TIOTROPIUM BROMIDE 1 CAPSULE(S): 18 CAPSULE ORAL; RESPIRATORY (INHALATION) at 11:57

## 2021-07-03 RX ADMIN — Medication 3 MILLILITER(S): at 11:58

## 2021-07-03 RX ADMIN — ENOXAPARIN SODIUM 40 MILLIGRAM(S): 100 INJECTION SUBCUTANEOUS at 11:57

## 2021-07-03 RX ADMIN — Medication 3 MILLILITER(S): at 06:58

## 2021-07-04 RX ADMIN — TIOTROPIUM BROMIDE 1 CAPSULE(S): 18 CAPSULE ORAL; RESPIRATORY (INHALATION) at 13:20

## 2021-07-04 RX ADMIN — Medication 3 MILLILITER(S): at 06:25

## 2021-07-04 RX ADMIN — Medication 3 MILLILITER(S): at 17:32

## 2021-07-04 RX ADMIN — Medication 3 MILLILITER(S): at 13:21

## 2021-07-04 RX ADMIN — BUDESONIDE AND FORMOTEROL FUMARATE DIHYDRATE 2 PUFF(S): 160; 4.5 AEROSOL RESPIRATORY (INHALATION) at 17:32

## 2021-07-04 RX ADMIN — ENOXAPARIN SODIUM 40 MILLIGRAM(S): 100 INJECTION SUBCUTANEOUS at 13:21

## 2021-07-04 RX ADMIN — BUDESONIDE AND FORMOTEROL FUMARATE DIHYDRATE 2 PUFF(S): 160; 4.5 AEROSOL RESPIRATORY (INHALATION) at 06:46

## 2021-07-04 NOTE — PROGRESS NOTE ADULT - PROBLEM SELECTOR PLAN 1
- does not appear to be in exacerbation    - no wheezing on exam ; she does appear to be short of breath  -  -c/w O2 2-3LNC (baseline 3LNC)  -Symbicort 160mcg/4.5mcg 2 puffs BID  -Spiriva 18mcg 1 capsule inhaled qd  -Duoneb q6h    - CTA  negative for PE but shows a new spiculated RUL mass

## 2021-07-05 LAB
ANION GAP SERPL CALC-SCNC: 10 MMOL/L — SIGNIFICANT CHANGE UP (ref 5–17)
BUN SERPL-MCNC: 15 MG/DL — SIGNIFICANT CHANGE UP (ref 7–23)
CALCIUM SERPL-MCNC: 9.1 MG/DL — SIGNIFICANT CHANGE UP (ref 8.4–10.5)
CHLORIDE SERPL-SCNC: 101 MMOL/L — SIGNIFICANT CHANGE UP (ref 96–108)
CO2 SERPL-SCNC: 25 MMOL/L — SIGNIFICANT CHANGE UP (ref 22–31)
CREAT SERPL-MCNC: 0.64 MG/DL — SIGNIFICANT CHANGE UP (ref 0.5–1.3)
GLUCOSE SERPL-MCNC: 88 MG/DL — SIGNIFICANT CHANGE UP (ref 70–99)
HCT VFR BLD CALC: 37 % — SIGNIFICANT CHANGE UP (ref 34.5–45)
HGB BLD-MCNC: 11.4 G/DL — LOW (ref 11.5–15.5)
MCHC RBC-ENTMCNC: 30.8 GM/DL — LOW (ref 32–36)
MCHC RBC-ENTMCNC: 31.6 PG — SIGNIFICANT CHANGE UP (ref 27–34)
MCV RBC AUTO: 102.5 FL — HIGH (ref 80–100)
NRBC # BLD: 0 /100 WBCS — SIGNIFICANT CHANGE UP (ref 0–0)
PLATELET # BLD AUTO: 162 K/UL — SIGNIFICANT CHANGE UP (ref 150–400)
POTASSIUM SERPL-MCNC: 3.9 MMOL/L — SIGNIFICANT CHANGE UP (ref 3.5–5.3)
POTASSIUM SERPL-SCNC: 3.9 MMOL/L — SIGNIFICANT CHANGE UP (ref 3.5–5.3)
RBC # BLD: 3.61 M/UL — LOW (ref 3.8–5.2)
RBC # FLD: 13.1 % — SIGNIFICANT CHANGE UP (ref 10.3–14.5)
SODIUM SERPL-SCNC: 136 MMOL/L — SIGNIFICANT CHANGE UP (ref 135–145)
WBC # BLD: 5.72 K/UL — SIGNIFICANT CHANGE UP (ref 3.8–10.5)
WBC # FLD AUTO: 5.72 K/UL — SIGNIFICANT CHANGE UP (ref 3.8–10.5)

## 2021-07-05 RX ORDER — CHLORHEXIDINE GLUCONATE 213 G/1000ML
1 SOLUTION TOPICAL DAILY
Refills: 0 | Status: DISCONTINUED | OUTPATIENT
Start: 2021-07-05 | End: 2021-07-09

## 2021-07-05 RX ADMIN — ENOXAPARIN SODIUM 40 MILLIGRAM(S): 100 INJECTION SUBCUTANEOUS at 12:54

## 2021-07-05 RX ADMIN — Medication 3 MILLILITER(S): at 18:03

## 2021-07-05 RX ADMIN — Medication 3 MILLILITER(S): at 00:33

## 2021-07-05 RX ADMIN — BUDESONIDE AND FORMOTEROL FUMARATE DIHYDRATE 2 PUFF(S): 160; 4.5 AEROSOL RESPIRATORY (INHALATION) at 18:03

## 2021-07-05 RX ADMIN — Medication 3 MILLILITER(S): at 23:15

## 2021-07-05 RX ADMIN — BUDESONIDE AND FORMOTEROL FUMARATE DIHYDRATE 2 PUFF(S): 160; 4.5 AEROSOL RESPIRATORY (INHALATION) at 05:38

## 2021-07-05 RX ADMIN — TIOTROPIUM BROMIDE 1 CAPSULE(S): 18 CAPSULE ORAL; RESPIRATORY (INHALATION) at 12:56

## 2021-07-05 RX ADMIN — Medication 3 MILLILITER(S): at 12:54

## 2021-07-05 RX ADMIN — Medication 3 MILLILITER(S): at 05:38

## 2021-07-05 NOTE — PROGRESS NOTE ADULT - TIME BILLING
- Review of records, telemetry, vital signs and daily labs.   - General and cardiovascular physical examination.  - Generation of cardiovascular treatment plan.  - Coordination of care.      Patient was seen and examined by me on 07/05/2021,interim events noted,labs and radiology studies reviewed.  Pedro Carpenter MD,FACC.  [address] [place], [place] [id number].  [phone]

## 2021-07-05 NOTE — PROGRESS NOTE ADULT - PROBLEM SELECTOR PLAN 1
- does not appear to be in exacerbation    - no wheezing on exam ; she does appear to be short of breath  -  -c/w O2 2-3LNC (baseline 3LNC)  -Symbicort 160mcg/4.5mcg 2 puffs BID  -Spiriva 18mcg 1 capsule inhaled qd  -Duoneb q6h    - CTA  negative for PE but shows a new spiculated RUL mass  poss dc after pulm clearance with out pt rul mass eval

## 2021-07-05 NOTE — PROGRESS NOTE ADULT - PROBLEM SELECTOR PLAN 1
-Breathing comfortably on O2 3LNC (baseline @ home), endorses WILKINSON  -S/p solumedrol 125mg IVP x1 in ED.  -C/w Symbicort 160mcg/4.5mcg 2 puffs BID  -C/w Spiriva 18mcg 1 capsule inhaled qd  -C/w Duoneb q6h  -ABG noted, 7.34/45/121/24  -CTA chest with severe emphysema  -Endorses slightly more WILKINSON than usual. Discussed short course of low dose prednisone with pt - she is hesitant to start steroids. Will f/u this afternoon. -Breathing comfortably on O2 3LNC (baseline @ home), endorses WILKINSON  -S/p solumedrol 125mg IVP x1 in ED.  -C/w Symbicort 160mcg/4.5mcg 2 puffs BID  -C/w Spiriva 18mcg 1 capsule inhaled qd  -C/w Duoneb q6h  -ABG noted, 7.34/45/121/24  -CTA chest with severe emphysema  -Endorses slightly more WILKINSON than usual. Discussed short course of low dose prednisone with pt - she is hesitant to start steroids.

## 2021-07-06 LAB
ANION GAP SERPL CALC-SCNC: 12 MMOL/L — SIGNIFICANT CHANGE UP (ref 5–17)
BUN SERPL-MCNC: 15 MG/DL — SIGNIFICANT CHANGE UP (ref 7–23)
CALCIUM SERPL-MCNC: 9.3 MG/DL — SIGNIFICANT CHANGE UP (ref 8.4–10.5)
CHLORIDE SERPL-SCNC: 100 MMOL/L — SIGNIFICANT CHANGE UP (ref 96–108)
CO2 SERPL-SCNC: 23 MMOL/L — SIGNIFICANT CHANGE UP (ref 22–31)
CREAT SERPL-MCNC: 0.65 MG/DL — SIGNIFICANT CHANGE UP (ref 0.5–1.3)
GLUCOSE SERPL-MCNC: 91 MG/DL — SIGNIFICANT CHANGE UP (ref 70–99)
HCT VFR BLD CALC: 35.9 % — SIGNIFICANT CHANGE UP (ref 34.5–45)
HGB BLD-MCNC: 11.3 G/DL — LOW (ref 11.5–15.5)
MCHC RBC-ENTMCNC: 31.5 GM/DL — LOW (ref 32–36)
MCHC RBC-ENTMCNC: 32 PG — SIGNIFICANT CHANGE UP (ref 27–34)
MCV RBC AUTO: 101.7 FL — HIGH (ref 80–100)
NRBC # BLD: 0 /100 WBCS — SIGNIFICANT CHANGE UP (ref 0–0)
PLATELET # BLD AUTO: 189 K/UL — SIGNIFICANT CHANGE UP (ref 150–400)
POTASSIUM SERPL-MCNC: 4 MMOL/L — SIGNIFICANT CHANGE UP (ref 3.5–5.3)
POTASSIUM SERPL-SCNC: 4 MMOL/L — SIGNIFICANT CHANGE UP (ref 3.5–5.3)
RBC # BLD: 3.53 M/UL — LOW (ref 3.8–5.2)
RBC # FLD: 13.2 % — SIGNIFICANT CHANGE UP (ref 10.3–14.5)
SODIUM SERPL-SCNC: 135 MMOL/L — SIGNIFICANT CHANGE UP (ref 135–145)
WBC # BLD: 5.08 K/UL — SIGNIFICANT CHANGE UP (ref 3.8–10.5)
WBC # FLD AUTO: 5.08 K/UL — SIGNIFICANT CHANGE UP (ref 3.8–10.5)

## 2021-07-06 PROCEDURE — 93306 TTE W/DOPPLER COMPLETE: CPT | Mod: 26

## 2021-07-06 RX ADMIN — CHLORHEXIDINE GLUCONATE 1 APPLICATION(S): 213 SOLUTION TOPICAL at 12:12

## 2021-07-06 RX ADMIN — BUDESONIDE AND FORMOTEROL FUMARATE DIHYDRATE 2 PUFF(S): 160; 4.5 AEROSOL RESPIRATORY (INHALATION) at 17:45

## 2021-07-06 RX ADMIN — Medication 3 MILLILITER(S): at 21:29

## 2021-07-06 RX ADMIN — Medication 3 MILLILITER(S): at 17:45

## 2021-07-06 RX ADMIN — BUDESONIDE AND FORMOTEROL FUMARATE DIHYDRATE 2 PUFF(S): 160; 4.5 AEROSOL RESPIRATORY (INHALATION) at 05:52

## 2021-07-06 RX ADMIN — ENOXAPARIN SODIUM 40 MILLIGRAM(S): 100 INJECTION SUBCUTANEOUS at 12:11

## 2021-07-06 RX ADMIN — Medication 3 MILLILITER(S): at 12:11

## 2021-07-06 RX ADMIN — Medication 3 MILLILITER(S): at 05:52

## 2021-07-06 RX ADMIN — TIOTROPIUM BROMIDE 1 CAPSULE(S): 18 CAPSULE ORAL; RESPIRATORY (INHALATION) at 12:12

## 2021-07-06 NOTE — DIETITIAN INITIAL EVALUATION ADULT. - FACTORS AFF FOOD INTAKE
in house pt reports intake has been suboptimal due to her not liking many foods (reports this is as a result of her being accustomed to how she eats and the spices she uses), has been eating 25-50% of most meals and some meals she is able to eat more; denies any chewing/swallowing issues, no issues c BMs at this time

## 2021-07-06 NOTE — DIETITIAN INITIAL EVALUATION ADULT. - OTHER INFO
Reports taking iron supplement, S-tonic (another iron supplement) and an amino acid blend but plans on stopping the amino acid blend.    Pt reports her wt had been in the 130s prior to being sick with COPD. Reports her wt has been as low as 99 pounds and most recently the highest it has been is 108 pounds, consistent c dosing wt. Reports though she has been losing wt as she has noticed a decline in her muscle mass.     Agreeable to taking Ensure Enlive in house-plans on having it every other day to optimize intake.     Declined any specific preferences at this time.

## 2021-07-06 NOTE — DIETITIAN INITIAL EVALUATION ADULT. - PERTINENT LABORATORY DATA
07-06 @ 06:30: Na 135, BUN 15, Cr 0.65, BG 91, K+ 4.0, Phos --, Mg --, Alk Phos --, ALT/SGPT --, AST/SGOT --, HbA1c --

## 2021-07-06 NOTE — DIETITIAN NUTRITION RISK NOTIFICATION - TREATMENT: THE FOLLOWING DIET HAS BEEN RECOMMENDED
Diet, Regular:   Supplement Feeding Modality:  Oral  Ensure Enlive Cans or Servings Per Day:  1       Frequency:  Daily (07-06-21 @ 13:26) [Pending Verification By Attending]  Diet, Regular (07-01-21 @ 16:29) [Active]

## 2021-07-06 NOTE — DIETITIAN INITIAL EVALUATION ADULT. - ORAL INTAKE PTA/DIET HISTORY
Pt reports she has always been a "small eater." Reports she usually consumes hot cereal for breakfast and lunch and dinner lately have been salads with protein given the hot weather. Reports she does occasionally take Ensure shakes but does not like to take it daily since she gets tired of it. Reports allergy to eggs (reaction is throat closes up) and will have itchy throat and hives with raw apples, pears and prefers to have organic peaches and nectarines to prevent any reaction.

## 2021-07-06 NOTE — CHART NOTE - NSCHARTNOTEFT_GEN_A_CORE
Patient Nicolle is a 83 f with hx copd/asthma required home oxygen   PT is still hopoxic due to her baseline copd while exerting, 92% resting on room air ambulation sat 87%, ambulatory sat 97% on 2 LNC   PT will require 2LPM oxygen during sleep and with exertion.     Department of Medicine   [facility] [id number]

## 2021-07-06 NOTE — DIETITIAN INITIAL EVALUATION ADULT. - EDUCATION DIETARY MODIFICATIONS
Encouraged PO intake-small, frequent meals and nutrient dense snacks. In house, encouraged pt to order nutrient dense snacks c meals to consume in between meals to mimic small, frequent meals. Discussed protein rich foods available on menu. Discussed consuming protein first at meal times and then eating the other foods./(2) meets goals/outcomes/verbalization

## 2021-07-06 NOTE — DIETITIAN INITIAL EVALUATION ADULT. - ADD RECOMMEND
1. Recommend Ensure Enlive x 1 daily (350kcal, 20g protein per 8 ounces).  2. RD remains available for further nutritional interventions as needed. 1. Recommend Ensure Enlive x 1 daily (350kcal, 20g protein per 8 ounces). Discussed c BRITTNEY Kelly, order pending verification placed. 2. RD remains available for further nutritional interventions as needed.

## 2021-07-06 NOTE — DIETITIAN INITIAL EVALUATION ADULT. - PERTINENT MEDS FT
MEDICATIONS  (STANDING):  albuterol/ipratropium for Nebulization 3 milliLiter(s) Nebulizer every 6 hours  budesonide 160 MICROgram(s)/formoterol 4.5 MICROgram(s) Inhaler 2 Puff(s) Inhalation two times a day  chlorhexidine 2% Cloths 1 Application(s) Topical daily  enoxaparin Injectable 40 milliGRAM(s) SubCutaneous daily  tiotropium 18 MICROgram(s) Capsule 1 Capsule(s) Inhalation daily    MEDICATIONS  (PRN):

## 2021-07-06 NOTE — DIETITIAN INITIAL EVALUATION ADULT. - PHYSCIAL ASSESSMENT
Skin: no pressure injuries   Pt provided verbal consent for nutrition focused physical exam. underweight

## 2021-07-06 NOTE — DIETITIAN INITIAL EVALUATION ADULT. - PROBLEM SELECTOR PLAN 5
- pt reports developing tremors about 1 month ago  - she was seen by Neurology ( Dr Jens Bustos - [phone])  - she is s/p open MRI Brain which she reports was unremarkable  - she reports occasional difficulty with writing and other ADLs on account of UE tremors  - would obtain Neurology consult in AM  - f/up with Dr Bustos's office in AM for MRI report  - PT consult

## 2021-07-06 NOTE — PROGRESS NOTE ADULT - PROBLEM SELECTOR PLAN 1
-Breathing comfortably on O2 3LNC (baseline @ home), endorses WILKINSON  -S/p solumedrol 125mg IVP x1 in ED  -C/w Symbicort 160mcg/4.5mcg 2 puffs BID  -C/w Spiriva 18mcg 1 capsule inhaled qd  -C/w Duoneb q6h  -7/1 ABG noted 7.34/45/121/24  -CTA chest with severe emphysema  -Endorses slightly more WILKINSON than usual. Discussed short course of low dose prednisone with pt - still hesitant to start steroids, but may consider. Wants to think about it a little further, will f/u

## 2021-07-07 ENCOUNTER — TRANSCRIPTION ENCOUNTER (OUTPATIENT)
Age: 71
End: 2021-07-07

## 2021-07-07 RX ADMIN — BUDESONIDE AND FORMOTEROL FUMARATE DIHYDRATE 2 PUFF(S): 160; 4.5 AEROSOL RESPIRATORY (INHALATION) at 05:11

## 2021-07-07 RX ADMIN — Medication 3 MILLILITER(S): at 11:09

## 2021-07-07 RX ADMIN — Medication 3 MILLILITER(S): at 05:10

## 2021-07-07 RX ADMIN — Medication 3 MILLILITER(S): at 21:48

## 2021-07-07 RX ADMIN — ENOXAPARIN SODIUM 40 MILLIGRAM(S): 100 INJECTION SUBCUTANEOUS at 11:09

## 2021-07-07 RX ADMIN — Medication 20 MILLIGRAM(S): at 12:13

## 2021-07-07 RX ADMIN — TIOTROPIUM BROMIDE 1 CAPSULE(S): 18 CAPSULE ORAL; RESPIRATORY (INHALATION) at 11:10

## 2021-07-07 RX ADMIN — CHLORHEXIDINE GLUCONATE 1 APPLICATION(S): 213 SOLUTION TOPICAL at 11:06

## 2021-07-07 RX ADMIN — Medication 3 MILLILITER(S): at 17:18

## 2021-07-07 RX ADMIN — BUDESONIDE AND FORMOTEROL FUMARATE DIHYDRATE 2 PUFF(S): 160; 4.5 AEROSOL RESPIRATORY (INHALATION) at 17:18

## 2021-07-07 NOTE — PROGRESS NOTE ADULT - PROBLEM SELECTOR PLAN 1
-Breathing comfortably on O2 3LNC (baseline @ home), endorses WILKINSON  -S/p solumedrol 125mg IVP x1 in ED  -C/w Symbicort 160mcg/4.5mcg 2 puffs BID  -C/w Spiriva 18mcg 1 capsule inhaled qd  -C/w Duoneb q6h  -7/1 ABG noted 7.34/45/121/24  -CTA chest with severe emphysema  -Endorses slightly more WILKINSON than usual. Pt has been refusing starting steroids, but now after working with PT today with c/o worsening breathlessness, she is now willing to try a low dose of prednisone. Start prednisone 20mg PO daily and observe.

## 2021-07-07 NOTE — DISCHARGE NOTE NURSING/CASE MANAGEMENT/SOCIAL WORK - PATIENT PORTAL LINK FT
You can access the FollowMyHealth Patient Portal offered by [facility] by registering at the following website: [email or web address]. By joining [employer]’s FollowMyHealth portal, you will also be able to view your health information using other applications (apps) compatible with our system.

## 2021-07-07 NOTE — PROGRESS NOTE ADULT - PROBLEM SELECTOR PLAN 3
no further c/o CP with inspiration - now CP seems more msk?  -CTA chest with no PE  -TTE with EF 63%  -Cards f/u

## 2021-07-07 NOTE — PROGRESS NOTE ADULT - ATTENDING COMMENTS
Pt to follow with her pulmonary doc at [facility] regarding further gonzalez about her possible lung cancer\  fu echo
agree w above
pt refusing prednisone  fu echo
continue current rx  Discussed the new possibly malignant nodule with pt. Talked about a possible biopsy if she would consider non surgical therapy since due to her underlying severe emphysema she is not a surgical candidate. She will discuss and think over weekend.  Keep sat>88% wo2

## 2021-07-08 ENCOUNTER — TRANSCRIPTION ENCOUNTER (OUTPATIENT)
Age: 71
End: 2021-07-08

## 2021-07-08 RX ADMIN — Medication 3 MILLILITER(S): at 17:07

## 2021-07-08 RX ADMIN — BUDESONIDE AND FORMOTEROL FUMARATE DIHYDRATE 2 PUFF(S): 160; 4.5 AEROSOL RESPIRATORY (INHALATION) at 17:07

## 2021-07-08 RX ADMIN — Medication 20 MILLIGRAM(S): at 05:22

## 2021-07-08 RX ADMIN — Medication 3 MILLILITER(S): at 05:20

## 2021-07-08 RX ADMIN — CHLORHEXIDINE GLUCONATE 1 APPLICATION(S): 213 SOLUTION TOPICAL at 11:55

## 2021-07-08 RX ADMIN — ENOXAPARIN SODIUM 40 MILLIGRAM(S): 100 INJECTION SUBCUTANEOUS at 11:53

## 2021-07-08 RX ADMIN — TIOTROPIUM BROMIDE 1 CAPSULE(S): 18 CAPSULE ORAL; RESPIRATORY (INHALATION) at 11:54

## 2021-07-08 RX ADMIN — BUDESONIDE AND FORMOTEROL FUMARATE DIHYDRATE 2 PUFF(S): 160; 4.5 AEROSOL RESPIRATORY (INHALATION) at 05:22

## 2021-07-08 RX ADMIN — Medication 3 MILLILITER(S): at 11:53

## 2021-07-08 RX ADMIN — Medication 3 MILLILITER(S): at 23:12

## 2021-07-08 NOTE — PROGRESS NOTE ADULT - PROBLEM SELECTOR PLAN 1
-Breathing comfortably on O2 3LNC (baseline @ home), endorses WILKINSON  -S/p solumedrol 125mg IVP x1 in ED  -C/w Symbicort 160mcg/4.5mcg 2 puffs BID  -C/w Spiriva 18mcg 1 capsule inhaled qd  -C/w Duoneb q6h  -7/1 ABG noted 7.34/45/121/24  -CTA chest with severe emphysema  -Endorses slightly more WILKINSON than usual. Pt has been refusing starting steroids, but after working with PT yesterday, c/o worsening breathlessness, she is now willing to try a low dose of prednisone. Prednisone 20mg PO qd started 7/7. Will f/u with pt after she ambulates with PT this afternoon and re-evaluate for further steroids. -Breathing comfortably on O2 3LNC (baseline @ home), endorses WILKINSON  -S/p solumedrol 125mg IVP x1 in ED  -C/w Symbicort 160mcg/4.5mcg 2 puffs BID  -C/w Spiriva 18mcg 1 capsule inhaled qd  -C/w Duoneb q6h  -7/1 ABG noted 7.34/45/121/24  -CTA chest with severe emphysema  -Endorses slightly more WILKINSON than usual. Pt has been refusing starting steroids, but after working with PT yesterday, c/o worsening breathlessness, she is now willing to try a low dose of prednisone. Prednisone 20mg PO qd started 7/7.

## 2021-07-08 NOTE — DISCHARGE NOTE PROVIDER - NSDCMRMEDTOKEN_GEN_ALL_CORE_FT
albuterol 2.5 mg/3 mL (0.083%) inhalation solution: 3 milliliter(s) inhaled 2 times a day    NOTE: Last dispensed by pharmacy 07/2020  Dulera 200 mcg-5 mcg/inh inhalation aerosol: 1 puff(s) inhaled 2 times a day   albuterol 2.5 mg/3 mL (0.083%) inhalation solution: 3 milliliter(s) inhaled 2 times a day    NOTE: Last dispensed by pharmacy 07/2020  Dulera 200 mcg-5 mcg/inh inhalation aerosol: 1 puff(s) inhaled 2 times a day  Rollaider : once a day    albuterol 2.5 mg/3 mL (0.083%) inhalation solution: 3 milliliter(s) inhaled 2 times a day    NOTE: Last dispensed by pharmacy 07/2020  budesonide-formoterol 160 mcg-4.5 mcg/inh inhalation aerosol: 2 puff(s) inhaled 2 times a day   predniSONE 5 mg oral tablet: 3 tab(s) orally once a day  Rollaider : once a day   tiotropium 18 mcg inhalation capsule: 1 cap(s) inhaled once a day

## 2021-07-08 NOTE — DISCHARGE NOTE PROVIDER - NSDCCPCAREPLAN_GEN_ALL_CORE_FT
PRINCIPAL DISCHARGE DIAGNOSIS  Diagnosis: COPD exacerbation  Assessment and Plan of Treatment: Call your Health Care provider upon arrival home to make a follow up appointment within one week.  Take all inhalers as prescribed by your Health Care Provider.  Take steroids as prescribed by your Health Care Provider.  If your cough increases infrequency and severity and/or you have shortness of breath or increased shortness of breath call your Health Care Provider.  If you develop fever, chills, night sweats, malaise, and/or change in mental status call your Health care Provider.  Nutrition is very important.  Eat small frequent meals.  Increase your activity as tolerated.  Do not stay in bed all day        SECONDARY DISCHARGE DIAGNOSES  Diagnosis: Chest pain on breathing  Assessment and Plan of Treatment: Chest pain on breathing    Diagnosis: Lung nodule  Assessment and Plan of Treatment: Pt will have to follow up with outpt Pulm doctor, Dr. Browning  at [facility] as schedule appointment on July 19th    Diagnosis: Emphysema/COPD  Assessment and Plan of Treatment: Emphysema/COPD     PRINCIPAL DISCHARGE DIAGNOSIS  Diagnosis: COPD exacerbation  Assessment and Plan of Treatment: Call your Health Care provider upon arrival home to make a follow up appointment within one week.  Take all inhalers as prescribed by your Health Care Provider.  Take steroids as prescribed by your Health Care Provider.  If your cough increases infrequency and severity and/or you have shortness of breath or increased shortness of breath call your Health Care Provider.  If you develop fever, chills, night sweats, malaise, and/or change in mental status call your Health care Provider.  Nutrition is very important.  Eat small frequent meals.  Increase your activity as tolerated.  Do not stay in bed all day        SECONDARY DISCHARGE DIAGNOSES  Diagnosis: Chest pain on breathing  Assessment and Plan of Treatment: Chest pain on breathing    Diagnosis: Lung nodule  Assessment and Plan of Treatment: Pt will have to follow up with outpt Pulm doctor, Dr. Browning  at [facility] as schedule appointment on July 19th    Diagnosis: Emphysema/COPD  Assessment and Plan of Treatment: The pulmonologist team recommended referral to Pulmonary Rehab.

## 2021-07-08 NOTE — DISCHARGE NOTE PROVIDER - DETAILS OF MALNUTRITION DIAGNOSIS/DIAGNOSES
This patient has been assessed with a concern for Malnutrition and was treated during this hospitalization for the following Nutrition diagnosis/diagnoses:     -  07/06/2021: Moderate protein-calorie malnutrition   -  07/06/2021: Underweight (BMI < 19)

## 2021-07-08 NOTE — PROGRESS NOTE ADULT - PROBLEM SELECTOR PROBLEM 6
Prophylactic measure
Interpolation Flap Text: A decision was made to reconstruct the defect utilizing an interpolation axial flap and a staged reconstruction.  A telfa template was made of the defect.  This telfa template was then used to outline the interpolation flap.  The donor area for the pedicle flap was then injected with anesthesia.  The flap was excised through the skin and subcutaneous tissue down to the layer of the underlying musculature.  The interpolation flap was carefully excised within this deep plane to maintain its blood supply.  The edges of the donor site were undermined.   The donor site was closed in a primary fashion.  The pedicle was then rotated into position and sutured.  Once the tube was sutured into place, adequate blood supply was confirmed with blanching and refill.  The pedicle was then wrapped with xeroform gauze and dressed appropriately with a telfa and gauze bandage to ensure continued blood supply and protect the attached pedicle.

## 2021-07-08 NOTE — PROGRESS NOTE ADULT - PROBLEM SELECTOR PLAN 1
- does not appear to be in exacerbation    - no wheezing on exam ; she does appear to be short of breath  -  -c/w O2 2-3LNC (baseline 3LNC)  -Symbicort 160mcg/4.5mcg 2 puffs BID  -Spiriva 18mcg 1 capsule inhaled qd  -Duoneb q6h    - CTA  negative for PE but shows a new spiculated RUL mass  - pt on steroids , pt says steroids make her confused   poss dc after pulm clearance with out pt rul mass eval

## 2021-07-08 NOTE — PROGRESS NOTE ADULT - NUTRITIONAL ASSESSMENT
This patient has been assessed with a concern for Malnutrition and has been determined to have a diagnosis/diagnoses of Moderate protein-calorie malnutrition and Underweight (BMI < 19).    This patient is being managed with:   Diet Regular-  Supplement Feeding Modality:  Oral  Ensure Enlive Cans or Servings Per Day:  1       Frequency:  Daily  Entered: Jul 6 2021  1:26PM    

## 2021-07-08 NOTE — DISCHARGE NOTE PROVIDER - HOSPITAL COURSE
DCP with [facility] rec discussed with Dr Schwartz 70F with h/o emphysema/COPD, Asthma (on home O2 3LNC) who presents with c/o  Left sided CP worse with inhalation and worsening SOB. Found to have new lung mass on imaging.  CTA  negative for PE but shows a new spiculated RUL mass  pt on steroids , pt says steroids make her confused   poss dc after pulm clearance with out pt rul mass eval.     Problem: Chest pain on breathing.  Plan: - worse with inspiration  CTA chest negative for PE but shows a new spiculated RUL mass ; concerning for malignancy    Problem: Lung nodule.  Plan: - CTA shows new 2.1 x 1.2 cm spiculated nodule in the right upper lobe ; concerning for malignancy  Pulm f/up for further work up re: biopsy etc.   follow up outpatient with Dr hudson follow up outpatient pulm rehab   DCP with med rec discussed with Dr Schwartz

## 2021-07-08 NOTE — DISCHARGE NOTE PROVIDER - NSFOLLOWUPCLINICS_GEN_ALL_ED_FT
[facility] Pulmonolgy and Sleep Medicine  Pulmonology  [address] [place] [address], [place] [address]  Phone: [phone]  Fax:

## 2021-07-08 NOTE — DISCHARGE NOTE PROVIDER - CARE PROVIDER_API CALL
Mingo Browning  CRITICAL CARE MEDICINE  [address] [id number]  Phone: [phone]  Fax: [phone]  Follow Up Time:

## 2021-07-08 NOTE — PROGRESS NOTE ADULT - PROBLEM SELECTOR PLAN 5
- pt reports developing tremors about 1 month ago  - she was seen by Neurology ( Dr Jens Bustos - [id number])  - she is s/p open MRI Brain which she reports was unremarkable  - she reports occasional difficulty with writing and other ADLs on account of UE tremors  - neuro f/u
With previous coronary artery bypass.  She denies any symptoms of angina continue the current medical therapy  
- pt reports developing tremors about 1 month ago  - she was seen by Neurology ( Dr Jnes Bustos - [id number])  - she is s/p open MRI Brain which she reports was unremarkable  - she reports occasional difficulty with writing and other ADLs on account of UE tremors  - would obtain Neurology consult in AM  - f/up with Dr Bustos's office in AM for MRI report  - PT consult
- pt reports developing tremors about 1 month ago  - she was seen by Neurology ( Dr Jens Bustos - [id number])  - she is s/p open MRI Brain which she reports was unremarkable  - she reports occasional difficulty with writing and other ADLs on account of UE tremors  - neuro f/u
- pt reports developing tremors about 1 month ago  - she was seen by Neurology ( Dr Jens Bustos - [id number])  - she is s/p open MRI Brain which she reports was unremarkable  - she reports occasional difficulty with writing and other ADLs on account of UE tremors  - neuro f/u
- pt reports developing tremors about 1 month ago  - she was seen by Neurology ( Dr Jens Bustos - [id number])  - she is s/p open MRI Brain which she reports was unremarkable  - she reports occasional difficulty with writing and other ADLs on account of UE tremors  - neuro f/u
- pt reports developing tremors about 1 month ago  - she was seen by Neurology ( Dr Jens Bustos - [id number])  - she is s/p open MRI Brain which she reports was unremarkable  - she reports occasional difficulty with writing and other ADLs on account of UE tremors  - neuro f/u
- pt reports developing tremors about 1 month ago  - she was seen by Neurology ( Dr Jens Bustos - [id number])  - she is s/p open MRI Brain which she reports was unremarkable  - she reports occasional difficulty with writing and other ADLs on account of UE tremors  - would obtain Neurology consult in AM  - f/up with Dr Bustos's office in AM for MRI report  - PT consult

## 2021-07-08 NOTE — PROGRESS NOTE ADULT - PROBLEM SELECTOR PLAN 6
DVT ppx ; Lovenox SC

## 2021-07-08 NOTE — PROGRESS NOTE ADULT - PROBLEM SELECTOR PROBLEM 5
Tremors of nervous system

## 2021-07-09 VITALS
OXYGEN SATURATION: 98 % | HEART RATE: 76 BPM | RESPIRATION RATE: 18 BRPM | SYSTOLIC BLOOD PRESSURE: 107 MMHG | DIASTOLIC BLOOD PRESSURE: 64 MMHG | TEMPERATURE: 98 F

## 2021-07-09 LAB
HCT VFR BLD CALC: 36.9 % — SIGNIFICANT CHANGE UP (ref 34.5–45)
HGB BLD-MCNC: 11.6 G/DL — SIGNIFICANT CHANGE UP (ref 11.5–15.5)
MCHC RBC-ENTMCNC: 31.4 GM/DL — LOW (ref 32–36)
MCHC RBC-ENTMCNC: 31.8 PG — SIGNIFICANT CHANGE UP (ref 27–34)
MCV RBC AUTO: 101.1 FL — HIGH (ref 80–100)
NRBC # BLD: 0 /100 WBCS — SIGNIFICANT CHANGE UP (ref 0–0)
PLATELET # BLD AUTO: 175 K/UL — SIGNIFICANT CHANGE UP (ref 150–400)
RBC # BLD: 3.65 M/UL — LOW (ref 3.8–5.2)
RBC # FLD: 13.2 % — SIGNIFICANT CHANGE UP (ref 10.3–14.5)
WBC # BLD: 8.46 K/UL — SIGNIFICANT CHANGE UP (ref 3.8–10.5)
WBC # FLD AUTO: 8.46 K/UL — SIGNIFICANT CHANGE UP (ref 3.8–10.5)

## 2021-07-09 PROCEDURE — 96365 THER/PROPH/DIAG IV INF INIT: CPT

## 2021-07-09 PROCEDURE — 97161 PT EVAL LOW COMPLEX 20 MIN: CPT

## 2021-07-09 PROCEDURE — 83605 ASSAY OF LACTIC ACID: CPT

## 2021-07-09 PROCEDURE — 93005 ELECTROCARDIOGRAM TRACING: CPT

## 2021-07-09 PROCEDURE — 93306 TTE W/DOPPLER COMPLETE: CPT

## 2021-07-09 PROCEDURE — 80048 BASIC METABOLIC PNL TOTAL CA: CPT

## 2021-07-09 PROCEDURE — 84132 ASSAY OF SERUM POTASSIUM: CPT

## 2021-07-09 PROCEDURE — 82803 BLOOD GASES ANY COMBINATION: CPT

## 2021-07-09 PROCEDURE — 93970 EXTREMITY STUDY: CPT

## 2021-07-09 PROCEDURE — 83880 ASSAY OF NATRIURETIC PEPTIDE: CPT

## 2021-07-09 PROCEDURE — 85018 HEMOGLOBIN: CPT

## 2021-07-09 PROCEDURE — 86769 SARS-COV-2 COVID-19 ANTIBODY: CPT

## 2021-07-09 PROCEDURE — 82947 ASSAY GLUCOSE BLOOD QUANT: CPT

## 2021-07-09 PROCEDURE — 80053 COMPREHEN METABOLIC PANEL: CPT

## 2021-07-09 PROCEDURE — 97116 GAIT TRAINING THERAPY: CPT

## 2021-07-09 PROCEDURE — U0005: CPT

## 2021-07-09 PROCEDURE — 99285 EMERGENCY DEPT VISIT HI MDM: CPT

## 2021-07-09 PROCEDURE — 97530 THERAPEUTIC ACTIVITIES: CPT

## 2021-07-09 PROCEDURE — 0225U NFCT DS DNA&RNA 21 SARSCOV2: CPT

## 2021-07-09 PROCEDURE — 36415 COLL VENOUS BLD VENIPUNCTURE: CPT

## 2021-07-09 PROCEDURE — 82330 ASSAY OF CALCIUM: CPT

## 2021-07-09 PROCEDURE — 36600 WITHDRAWAL OF ARTERIAL BLOOD: CPT

## 2021-07-09 PROCEDURE — 96375 TX/PRO/DX INJ NEW DRUG ADDON: CPT

## 2021-07-09 PROCEDURE — 86803 HEPATITIS C AB TEST: CPT

## 2021-07-09 PROCEDURE — 71275 CT ANGIOGRAPHY CHEST: CPT

## 2021-07-09 PROCEDURE — 94640 AIRWAY INHALATION TREATMENT: CPT

## 2021-07-09 PROCEDURE — 71045 X-RAY EXAM CHEST 1 VIEW: CPT

## 2021-07-09 PROCEDURE — 82435 ASSAY OF BLOOD CHLORIDE: CPT

## 2021-07-09 PROCEDURE — 84484 ASSAY OF TROPONIN QUANT: CPT

## 2021-07-09 PROCEDURE — 85014 HEMATOCRIT: CPT

## 2021-07-09 PROCEDURE — 84295 ASSAY OF SERUM SODIUM: CPT

## 2021-07-09 PROCEDURE — 85027 COMPLETE CBC AUTOMATED: CPT

## 2021-07-09 PROCEDURE — U0003: CPT

## 2021-07-09 PROCEDURE — 85025 COMPLETE CBC W/AUTO DIFF WBC: CPT

## 2021-07-09 RX ORDER — BUDESONIDE AND FORMOTEROL FUMARATE DIHYDRATE 160; 4.5 UG/1; UG/1
2 AEROSOL RESPIRATORY (INHALATION)
Qty: 120 | Refills: 0
Start: 2021-07-09 | End: 2021-08-07

## 2021-07-09 RX ORDER — TIOTROPIUM BROMIDE 18 UG/1
1 CAPSULE ORAL; RESPIRATORY (INHALATION)
Qty: 1 | Refills: 0
Start: 2021-07-09 | End: 2021-08-07

## 2021-07-09 RX ORDER — MOMETASONE FUROATE AND FORMOTEROL FUMARATE DIHYDRATE 200; 5 UG/1; UG/1
1 AEROSOL RESPIRATORY (INHALATION)
Qty: 0 | Refills: 0 | DISCHARGE

## 2021-07-09 RX ADMIN — TIOTROPIUM BROMIDE 1 CAPSULE(S): 18 CAPSULE ORAL; RESPIRATORY (INHALATION) at 12:48

## 2021-07-09 RX ADMIN — Medication 3 MILLILITER(S): at 12:48

## 2021-07-09 RX ADMIN — CHLORHEXIDINE GLUCONATE 1 APPLICATION(S): 213 SOLUTION TOPICAL at 12:49

## 2021-07-09 RX ADMIN — BUDESONIDE AND FORMOTEROL FUMARATE DIHYDRATE 2 PUFF(S): 160; 4.5 AEROSOL RESPIRATORY (INHALATION) at 10:31

## 2021-07-09 RX ADMIN — Medication 3 MILLILITER(S): at 06:08

## 2021-07-09 RX ADMIN — Medication 20 MILLIGRAM(S): at 06:09

## 2021-07-09 RX ADMIN — ENOXAPARIN SODIUM 40 MILLIGRAM(S): 100 INJECTION SUBCUTANEOUS at 12:49

## 2021-07-09 NOTE — PROGRESS NOTE ADULT - PROVIDER SPECIALTY LIST ADULT
Cardiology
Internal Medicine
Cardiology
Pulmonology
Internal Medicine
Pulmonology
Pulmonology
Internal Medicine
Pulmonology
Internal Medicine

## 2021-07-09 NOTE — PROGRESS NOTE ADULT - SUBJECTIVE AND OBJECTIVE BOX
Follow-up Pulm Progress Note    Remains at baseline O2 requirements (2-3LNC) O2 sats 95%  Continues to dyspnea on exertion slightly more than at baseline    Medications:  MEDICATIONS  (STANDING):  albuterol/ipratropium for Nebulization 3 milliLiter(s) Nebulizer every 6 hours  budesonide 160 MICROgram(s)/formoterol 4.5 MICROgram(s) Inhaler 2 Puff(s) Inhalation two times a day  chlorhexidine 2% Cloths 1 Application(s) Topical daily  enoxaparin Injectable 40 milliGRAM(s) SubCutaneous daily  tiotropium 18 MICROgram(s) Capsule 1 Capsule(s) Inhalation daily    Vital Signs Last 24 Hrs  T(C): 36.9 (06 Jul 2021 07:37), Max: 37.2 (05 Jul 2021 16:00)  T(F): 98.5 (06 Jul 2021 07:37), Max: 98.9 (05 Jul 2021 16:00)  HR: 72 (06 Jul 2021 07:37) (72 - 77)  BP: 101/62 (06 Jul 2021 07:37) (100/60 - 108/68)  BP(mean): --  RR: 18 (06 Jul 2021 07:37) (18 - 18)  SpO2: 96% (06 Jul 2021 07:37) (96% - 100%)    07-05 @ 07:01  -  07-06 @ 07:00  --------------------------------------------------------  IN: 420 mL / OUT: 0 mL / NET: 420 mL    LABS:                        11.3   5.08  )-----------( 189      ( 06 Jul 2021 06:31 )             35.9     07-06    135  |  100  |  15  ----------------------------<  91  4.0   |  23  |  0.65    Ca    9.3      06 Jul 2021 06:30    Physical Examination:  PULM: Clear to auscultation bilaterally, no significant sputum production  CVS: RRR    RADIOLOGY REVIEWED  CT chest: < from: CT Angio Chest PE Protocol w/ IV Cont (07.01.21 @ 16:35) >  INTERPRETATION:  Reason for Exam: Shortness of breath. Chest pain.    CTA of the chest was performed from the thoracic inlet to the level of the adrenal glands following IV contrast injection of  80 cc of Omnipaque 350. No immediate complications were reported.  MIP images were also created and reviewed.    Comparison: June 28, 2016    Tubes/Lines: None    Mediastinum and Heart: Aorta and pulmonary arteries are normal in size. Thyroid gland is unremarkable. No lymphadenopathy. No pericardial effusion.    Lungs, Pleura, and Airways: There is no pulmonary embolus. Severe emphysema noted. There is architectural distortion and bronchiectasis in the left upper lobe, with improved aeration when compared with previous exam.    In the right lung there is a new 2.1 x 1.2 cm spiculated nodule on series 2 image 37.    There is complete atelectasis of the right middle lobe, unchanged since prior without associated endobronchial lesion.    Visualized Abdomen: Suprarenal 2.5 cm aortic aneurysm. Upper abdomen is otherwise unremarkable.    Bones and soft tissues: Unremarkable.    IMPRESSION:    No pulmonary embolus.      New 2.1 x 1.2 cm spiculated nodule in the right upper lobe when compared with 2016 examination. This finding is indeterminate but concerning for malignancy given the concurrent finding of severe emphysema. No associated lymphadenopathy.    Significant left upper lobe architectural distortion with improved aeration since the previous exam.        < end of copied text >  
    SUBJECTIVE / OVERNIGHT EVENTS:    MEDICATIONS  (STANDING):  albuterol/ipratropium for Nebulization 3 milliLiter(s) Nebulizer every 6 hours  budesonide 160 MICROgram(s)/formoterol 4.5 MICROgram(s) Inhaler 2 Puff(s) Inhalation two times a day  chlorhexidine 2% Cloths 1 Application(s) Topical daily  enoxaparin Injectable 40 milliGRAM(s) SubCutaneous daily  tiotropium 18 MICROgram(s) Capsule 1 Capsule(s) Inhalation daily    MEDICATIONS  (PRN):    Vital Signs Last 24 Hrs  T(C): 37 (05 Jul 2021 11:30), Max: 37.1 (05 Jul 2021 00:44)  T(F): 98.6 (05 Jul 2021 11:30), Max: 98.8 (05 Jul 2021 00:44)  HR: 77 (05 Jul 2021 11:30) (69 - 77)  BP: 106/70 (05 Jul 2021 11:30) (102/64 - 106/70)  BP(mean): --  RR: 18 (05 Jul 2021 11:30) (18 - 18)  SpO2: 98% (05 Jul 2021 11:30) (98% - 98%)    Constitutional: No fever, fatigue  Skin: No rash.  Eyes: No recent vision problems or eye pain.  ENT: No congestion, ear pain, or sore throat.  Cardiovascular: No chest pain or palpation.  Respiratory: No cough, shortness of breath, congestion, or wheezing.  Gastrointestinal: No abdominal pain, nausea, vomiting, or diarrhea.  Genitourinary: No dysuria.  Musculoskeletal: No joint swelling.  Neurologic: No headache.    PHYSICAL EXAM:  GENERAL: NAD  EYES: EOMI, PERRLA  NECK: Supple, No JVD  CHEST/LUNG: dec breath sounds rt base  HEART:  S1 , S2 +  ABDOMEN: soft , bs+  EXTREMITIES:  trace edema  NEUROLOGY:alert awake    LABS:  07-05    136  |  101  |  15  ----------------------------<  88  3.9   |  25  |  0.64    Ca    9.1      05 Jul 2021 07:03      Creatinine Trend: 0.64 <--, 0.67 <--                        11.4   5.72  )-----------( 162      ( 05 Jul 2021 07:03 )             37.0     Urine Studies:                
DATE OF SERVICE:  07/04/2021  Patient was seen and examined on 07/04/2021    .Interim events noted.Consultant notes ,Labs,Telemetry reviewed by me    PRESENTING CC:Chest pain    HPI and HOSPITAL COURSE: 70F with h/o emphysema/COPD, Asthma (on home O2 3LNC) who presents with c/o sharp left sided CP worse with inhalation and worsening SOB. She states that she has not been feeling well  for the past week. She reports left knee swelling, which has now improved. She denies cough, wheezing, fever/chills.       INTERIM EVENTS:      PMH -reviewed admission note, no change since admission  Heart Failure: Acute [ ] Chronic [ ] Acute on Chronic [ ] Diastolic [ ] Systolic [ ] Combined Systolic and Diastolic[ ]  HARSH[ ]  ATN[ ]  CKD I [ ] CKDII [ ] CKD III [ ] CKD IV [ ] CKD V [ ] ESRD[ ]  HTN[ ] CVA[ ] DM[ ] COPD[ ] COVID[ ] AF[ ]  PPM[ ] ICD[ ]    MEDICATIONS  (STANDING):  albuterol/ipratropium for Nebulization 3 milliLiter(s) Nebulizer every 6 hours  budesonide 160 MICROgram(s)/formoterol 4.5 MICROgram(s) Inhaler 2 Puff(s) Inhalation two times a day  enoxaparin Injectable 40 milliGRAM(s) SubCutaneous daily  tiotropium 18 MICROgram(s) Capsule 1 Capsule(s) Inhalation daily    REVIEW OF SYSTEMS:  Constitutional: [ ] fever, [ ]weight loss,  [ ]fatigue  Eyes: [ ] visual changes  Respiratory: [ ]shortness of breath;  [ ] cough, [ ]wheezing, [ ]chills, [ ]hemoptysis  Cardiovascular: [ ] chest pain, [ ]palpitations, [ ]dizziness,  [ ]leg swelling[ ]orthopnea[ ]PND  Gastrointestinal: [ ] abdominal pain, [ ]nausea, [ ]vomiting,  [ ]diarrhea [ ]Constipation [ ]Melena  Genitourinary: [ ] dysuria, [ ] hematuria [ ]Guerrier  Neurologic: [ ] headaches [ ] tremors[ ]weakness [ ]Paralysis Right[ ] Left[ ]  Skin: [ ] itching, [ ]burning, [ ] rashes  Endocrine: [ ] heat or cold intolerance  Musculoskeletal: [ ] joint pain or swelling; [ ] muscle, back, or extremity pain  Psychiatric: [ ] depression, [ ]anxiety, [ ]mood swings, or [ ]difficulty sleeping  Hematologic: [ ] easy bruising, [ ] bleeding gums    [x] All remaining systems negative except as per above.   [ ]Unable to obtain.    Vital Signs Last 24 Hrs  T(C): 36.9 (04 Jul 2021 00:39), Max: 36.9 (04 Jul 2021 00:39)  T(F): 98.5 (04 Jul 2021 00:39), Max: 98.5 (04 Jul 2021 00:39)  HR: 79 (04 Jul 2021 00:39) (79 - 83)  BP: 96/63 (04 Jul 2021 00:39) (96/63 - 99/63)  RR: 18 (04 Jul 2021 00:39) (18 - 18)  SpO2: 97% (04 Jul 2021 00:39) (97% - 97%)  I&O's Summary    03 Jul 2021 07:01  -  04 Jul 2021 07:00  --------------------------------------------------------  IN: 660 mL / OUT: 0 mL / NET: 660 mL        PHYSICAL EXAM:  General: No acute distress BMI-  HEENT: EOMI, PERRL  Neck: Supple, [ ] JVD  Lungs: Equal air entry bilaterally; [ ] rales [ ] wheezing [ ] rhonchi  Heart: Regular rate and rhythm; [ ] murmur   /6 [ ] systolic [ ] diastolic [ ] radiation[ ] rubs [ ]  gallops  Abdomen: Nontender, bowel sounds present  Extremities: No clubbing, cyanosis, [ ] edema [ ]Pulses  equal and intact  Nervous system:  Alert & Oriented X3, no focal deficits  Psychiatric: Normal affect  Skin: No rashes or lesions    LABS:        Creatinine Trend: 0.67<--        Cardiac Enzymes:           RADIOLOGY:    ECG [my interpretation]:    TELEMETRY:Reviewed monitor tracings-    ECHO:    STRESS TEST:    CATHETERIZATION:      IMPRESSION AND PLAN:      
DATE OF SERVICE: 07/05/2021   Patient was seen and examined on   07/05/2021  .Interim events noted.Consultant notes ,Labs,Telemetry reviewed by me    PRESENTING CC:Chest pain    HPI and HOSPITAL COURSE: HPI:  70F with h/o emphysema/COPD, Asthma (on home O2 3LNC) who presents with c/o sharp left sided CP worse with inhalation and worsening SOB. She states that she has not been feeling well  for the past week. She reports left knee swelling, which has now improved. She denies cough, wheezing, fever/chills.     INTERIM EVENTS:Awake alert still has localised left anterior axillary pain worse on movement dyspnea is better no overnight events noted      PMH -reviewed admission note, no change since admission  Heart Failure: Acute [ ] Chronic [ ] Acute on Chronic [ ] Diastolic [ ] Systolic [ ] Combined Systolic and Diastolic[ ]  HARSH[ ]  ATN[ ]  CKD I [ ] CKDII [ ] CKD III [ ] CKD IV [ ] CKD V [ ] ESRD[ ]  HTN[ ] CVA[ ] DM[ ] COPD[ ] COVID[ ] AF[ ]  PPM[ ] ICD[ ]    MEDICATIONS  (STANDING):  albuterol/ipratropium for Nebulization 3 milliLiter(s) Nebulizer every 6 hours  budesonide 160 MICROgram(s)/formoterol 4.5 MICROgram(s) Inhaler 2 Puff(s) Inhalation two times a day  enoxaparin Injectable 40 milliGRAM(s) SubCutaneous daily  tiotropium 18 MICROgram(s) Capsule 1 Capsule(s) Inhalation daily              REVIEW OF SYSTEMS:  Constitutional: [ ] fever, [ ]weight loss,  [x ]fatigue  Eyes: [ ] visual changes  Respiratory: [x ]shortness of breath;  [ ] cough, [ ]wheezing, [ ]chills, [ ]hemoptysis  Cardiovascular: [x ] chest pain, [ ]palpitations, [ ]dizziness,  [ ]leg swelling[ ]orthopnea[ ]PND  Gastrointestinal: [ ] abdominal pain, [ ]nausea, [ ]vomiting,  [ ]diarrhea [ ]Constipation [ ]Melena  Genitourinary: [ ] dysuria, [ ] hematuria [ ]Guerrier  Neurologic: [ ] headaches [ ] tremors[ ]weakness [ ]Paralysis Right[ ] Left[ ]  Skin: [ ] itching, [ ]burning, [ ] rashes  Endocrine: [ ] heat or cold intolerance  Musculoskeletal: [ ] joint pain or swelling; [ ] muscle, back, or extremity pain  Psychiatric: [ ] depression, [ ]anxiety, [ ]mood swings, or [ ]difficulty sleeping  Hematologic: [ ] easy bruising, [ ] bleeding gums    [x] All remaining systems negative except as per above.   [ ]Unable to obtain.    Vital Signs Last 24 Hrs  T(C): 37.1 (05 Jul 2021 00:44), Max: 37.1 (05 Jul 2021 00:44)  T(F): 98.8 (05 Jul 2021 00:44), Max: 98.8 (05 Jul 2021 00:44)  HR: 69 (05 Jul 2021 00:44) (69 - 84)  BP: 102/64 (05 Jul 2021 00:44) (101/65 - 112/77)  RR: 18 (05 Jul 2021 00:44) (16 - 18)  SpO2: 98% (05 Jul 2021 00:44) (97% - 98%)  I&O's Summary    03 Jul 2021 07:01  -  04 Jul 2021 07:00  --------------------------------------------------------  IN: 660 mL / OUT: 0 mL / NET: 660 mL    04 Jul 2021 07:01  -  05 Jul 2021 06:27  --------------------------------------------------------  IN: 720 mL / OUT: 0 mL / NET: 720 mL        PHYSICAL EXAM:  General: No acute distress BMI-25  HEENT: EOMI, PERRL  Neck: Supple, [ ] JVD  Lungs: Equal air entry bilaterally; [ ] rales [ ] wheezing [ ] rhonchi  Heart: Regular rate and rhythm; [x ] murmur  2 /6 [x ] systolic [ ] diastolic [ ] radiation[ ] rubs [ ]  gallops  Abdomen: Nontender, bowel sounds present  Extremities: No clubbing, cyanosis, [ ] edema [ ]Pulses  equal and intact  Nervous system:  Alert & Oriented X3, no focal deficits  Psychiatric: Normal affect  Skin: No rashes or lesions    LABS:        Creatinine Trend: 0.67<--      ECHO:Study Date: 6/30/2016  Conclusions:  1. Normal mitral valve. Moderate mitral regurgitation.  2. Normal trileaflet aortic valve.  3. Normal left atrium.  LA volumeindex = 22 cc/m2.  4. Increased relative wall thickness with normal left ventricular mass index, consistent with concentric left ventricular remodeling.  5. Normal left ventricular systolic function. No segmental wall motion abnormalities.  6. Reversal of the E-A waves of the mitral inflow pattern consistent with reduced compliance of the left ventricle.  7. Normal right ventricular size and function.  8. Estimated right ventricular systolic pressure equals 52 mm Hg, assuming right atrial pressure equals 8 mm Hg, consistent with moderate pulmonary hypertension.  9. Normal pericardium with no pericardial effusion.      CT CHEST-PROCEDURE DATE:  07/01/2021    IMPRESSION:  No pulmonary embolus.  New 2.1 x 1.2 cm spiculated nodule in the right upper lobe when compared with 2016 examination. This finding is indeterminate but concerning for malignancy given the concurrent finding of severe emphysema. No associated lymphadenopathy.  Significant left upper lobe architectural distortion with improved aeration since the previous exam.    IMPRESSION AND PLAN:  70F with h/o emphysema/COPD, Asthma (on home O2 3LNC) who presents with c/o  Left sided CP worse with inhalation and worsening SOB. Found to have new lung mass on imaging.    Problem/Plan - 1:  ·  Problem: Pulmonary emphysema, unspecified emphysema type.  Plan: - does not appear to be in exacerbation  - no wheezing on exam ; she does appear to be short of breath  -s/p solumedrol 125mg IVP x1 in ED. Would hold off on further steroids per Pulm reccs  -c/w O2 2-3LNC (baseline 3LNC)  -Symbicort 160mcg/4.5mcg 2 puffs BID  -Spiriva 18mcg 1 capsule inhaled qd  -Duoneb q6h  - CTA  negative for PE but shows a new spiculated RUL mass.    Problem/Plan - 2:  ·  Problem: Chest pain on breathing.  Plan: - worse with inspiration  - CTA chest negative for PE but shows a new spiculated RUL mass ; concerning for malignancy  -TTE prior LVEF 66%      Problem/Plan - 3:  ·  Problem: Lung nodule.  Plan: - CTA shows new 2.1 x 1.2 cm spiculated nodule in the right upper lobe ; concerning for malignancy  - Pulm f/up for further work up re: biopsy etc.     Problem/Plan - 4:  ·  Problem: Lower extremity edema.  Plan: - reports having b/l LE edema up to the knees over the past week w/associated LE tingling  - now resolved  - f/up LE doppler.     Problem/Plan - 5:  ·  Problem: Tremors of nervous system. Plan: - pt reports developing tremors about 1 month ago  - she was seen by Neurology ( Dr Jens Bustos - [phone])  - she is s/p open MRI Brain which she reports was unremarkable  - she reports occasional difficulty with writing and other ADLs on account of UE tremors  - PT consult.    Problem/Plan - 6:  Problem: Prophylactic measure. Plan: DVT ppx ; Lovenox SC.      
Follow-up Pulm Progress Note    States baseline dyspnea on exertion x years  Feeling slightly more dyspneic on exertion than baseline  At baseline O2 requirements.     Medications:  MEDICATIONS  (STANDING):  albuterol/ipratropium for Nebulization 3 milliLiter(s) Nebulizer every 6 hours  budesonide 160 MICROgram(s)/formoterol 4.5 MICROgram(s) Inhaler 2 Puff(s) Inhalation two times a day  chlorhexidine 2% Cloths 1 Application(s) Topical daily  enoxaparin Injectable 40 milliGRAM(s) SubCutaneous daily  tiotropium 18 MICROgram(s) Capsule 1 Capsule(s) Inhalation daily        Vital Signs Last 24 Hrs  T(C): 37.1 (05 Jul 2021 00:44), Max: 37.1 (05 Jul 2021 00:44)  T(F): 98.8 (05 Jul 2021 00:44), Max: 98.8 (05 Jul 2021 00:44)  HR: 69 (05 Jul 2021 00:44) (69 - 84)  BP: 102/64 (05 Jul 2021 00:44) (101/65 - 112/77)  BP(mean): --  RR: 18 (05 Jul 2021 00:44) (16 - 18)  SpO2: 98% (05 Jul 2021 00:44) (97% - 98%)          07-04 @ 07:01  -  07-05 @ 07:00  --------------------------------------------------------  IN: 720 mL / OUT: 0 mL / NET: 720 mL          LABS:                        11.4   5.72  )-----------( 162      ( 05 Jul 2021 07:03 )             37.0     07-05    136  |  101  |  15  ----------------------------<  88  3.9   |  25  |  0.64    Ca    9.1      05 Jul 2021 07:03                Physical Examination:  PULM: decreased BS  CVS: S1, S2 heard    RADIOLOGY REVIEWED  CT chest: < from: CT Angio Chest PE Protocol w/ IV Cont (07.01.21 @ 16:35) >    Tubes/Lines: None    Mediastinum and Heart: Aorta and pulmonary arteries are normal in size. Thyroid gland is unremarkable. No lymphadenopathy. No pericardial effusion.    Lungs, Pleura, and Airways: There is no pulmonary embolus. Severe emphysema noted. There is architectural distortion and bronchiectasis in the left upper lobe, with improved aeration when compared with previous exam.    In the right lung there is a new 2.1 x 1.2 cm spiculated nodule on series 2 image 37.    There is complete atelectasis of the right middle lobe, unchanged since prior without associated endobronchial lesion.    Visualized Abdomen: Suprarenal 2.5 cm aortic aneurysm. Upper abdomen is otherwise unremarkable.    Bones and soft tissues: Unremarkable.    IMPRESSION:    No pulmonary embolus.      New 2.1 x 1.2 cm spiculated nodule in the right upper lobe when compared with 2016 examination. This finding is indeterminate but concerning for malignancy given the concurrent finding of severe emphysema. No associated lymphadenopathy.    Significant left upper lobe architectural distortion with improved aeration since the previous exam.    < end of copied text >      
Follow-up Pulm Progress Note    No new respiratory events overnight.    O2 sats mid 90s on baseline o2 requirements (3LNC)  Denies CP, SOB  Reports some mild L pleuritic chest pain with deep breaths     Medications:  MEDICATIONS  (STANDING):  albuterol/ipratropium for Nebulization 3 milliLiter(s) Nebulizer every 6 hours  budesonide 160 MICROgram(s)/formoterol 4.5 MICROgram(s) Inhaler 2 Puff(s) Inhalation two times a day  enoxaparin Injectable 40 milliGRAM(s) SubCutaneous daily  tiotropium 18 MICROgram(s) Capsule 1 Capsule(s) Inhalation daily    Vital Signs Last 24 Hrs  T(C): 36.9 (02 Jul 2021 08:34), Max: 37.3 (01 Jul 2021 23:45)  T(F): 98.4 (02 Jul 2021 08:34), Max: 99.2 (01 Jul 2021 23:45)  HR: 90 (02 Jul 2021 08:34) (74 - 109)  BP: 109/65 (02 Jul 2021 08:34) (109/65 - 155/92)  BP(mean): 101 (01 Jul 2021 16:21) (101 - 109)  RR: 18 (02 Jul 2021 08:34) (17 - 20)  SpO2: 99% (02 Jul 2021 08:34) (97% - 100%)    ABG - ( 01 Jul 2021 20:22 )  pH, Arterial: 7.34  pH, Blood: x     /  pCO2: 45    /  pO2: 121   / HCO3: 24    / Base Excess: -1.3  /  SaO2: 98      VBG pH 7.32 07-01 @ 15:59    VBG pCO2 54 07-01 @ 15:59    VBG O2 sat 80 07-01 @ 15:59    VBG lactate 1.4 07-01 @ 15:59  VBG pH 7.28 07-01 @ 12:37    VBG pCO2 67 07-01 @ 12:37    VBG O2 sat 23 07-01 @ 12:37    VBG lactate 3.8 07-01 @ 12:37      07-01 @ 07:01  -  07-02 @ 07:00  --------------------------------------------------------  IN: 0 mL / OUT: 300 mL / NET: -300 mL     LABS:                        13.8   7.58  )-----------( 219      ( 01 Jul 2021 12:36 )             44.3     07-01    144  |  101  |  9   ----------------------------<  119<H>  4.2   |  26  |  0.67    Ca    10.0      01 Jul 2021 12:36    TPro  10.1<H>  /  Alb  4.4  /  TBili  0.6  /  DBili  x   /  AST  66<H>  /  ALT  75<H>  /  AlkPhos  132<H>  07-01    Serum Pro-Brain Natriuretic Peptide: 51 pg/mL (07-01-21 @ 12:36)    Physical Examination:  PULM: Clear to auscultation bilaterally, no significant sputum production, no wheezing on exam   CVS: RRR     RADIOLOGY REVIEWED  CXR: BLANCA scarring, emphysema     CT chest:< from: CT Angio Chest PE Protocol w/ IV Cont (07.01.21 @ 16:35) >  Tubes/Lines: None    Mediastinum and Heart: Aorta and pulmonary arteries are normal in size. Thyroid gland is unremarkable. No lymphadenopathy. No pericardial effusion.    Lungs, Pleura, and Airways: There is no pulmonary embolus. Severe emphysema noted. There is architectural distortion and bronchiectasis in the left upper lobe, with improved aeration when compared with previous exam.    In the right lung there is a new 2.1 x 1.2 cm spiculated nodule on series 2 image 37.    There is complete atelectasis of the right middle lobe, unchanged since prior without associated endobronchial lesion.    Visualized Abdomen: Suprarenal 2.5 cm aortic aneurysm. Upper abdomen is otherwise unremarkable.    Bones and soft tissues: Unremarkable.    IMPRESSION:    No pulmonary embolus.      New 2.1 x 1.2 cm spiculated nodule in the right upper lobe when compared with 2016 examination. This finding is indeterminate but concerning for malignancy given the concurrent finding of severe emphysema. No associated lymphadenopathy.    Significant left upper lobe architectural distortion with improved aeration since the previous exam.    < end of copied text >        
    SUBJECTIVE / OVERNIGHT EVENTS:    MEDICATIONS  (STANDING):  albuterol/ipratropium for Nebulization 3 milliLiter(s) Nebulizer every 6 hours  budesonide 160 MICROgram(s)/formoterol 4.5 MICROgram(s) Inhaler 2 Puff(s) Inhalation two times a day  enoxaparin Injectable 40 milliGRAM(s) SubCutaneous daily  tiotropium 18 MICROgram(s) Capsule 1 Capsule(s) Inhalation daily    MEDICATIONS  (PRN):    Vital Signs Last 24 Hrs  T(C): 36.9 (04 Jul 2021 00:39), Max: 36.9 (04 Jul 2021 00:39)  T(F): 98.5 (04 Jul 2021 00:39), Max: 98.5 (04 Jul 2021 00:39)  HR: 79 (04 Jul 2021 00:39) (77 - 83)  BP: 96/63 (04 Jul 2021 00:39) (96/63 - 104/67)  BP(mean): --  RR: 18 (04 Jul 2021 00:39) (18 - 18)  SpO2: 97% (04 Jul 2021 00:39) (97% - 99%)        Constitutional: No fever, fatigue  Skin: No rash.  Eyes: No recent vision problems or eye pain.  ENT: No congestion, ear pain, or sore throat.  Cardiovascular: No chest pain or palpation.  Respiratory: No cough, shortness of breath, congestion, or wheezing.  Gastrointestinal: No abdominal pain, nausea, vomiting, or diarrhea.  Genitourinary: No dysuria.  Musculoskeletal: No joint swelling.  Neurologic: No headache.    PHYSICAL EXAM:  GENERAL: NAD  EYES: EOMI, PERRLA  NECK: Supple, No JVD  CHEST/LUNG: dec breath sounds rt base  HEART:  S1 , S2 +  ABDOMEN: soft , bs+  EXTREMITIES:  trace edema  NEUROLOGY:alert awake    LABS:        Creatinine Trend: 0.67 <--    Urine Studies:                
Follow-up Pulm Progress Note    Pt feeling anxious with prednisone  Sats 97% 2LNC  Some improvement in [place]   Denies CP     Medications:  MEDICATIONS  (STANDING):  albuterol/ipratropium for Nebulization 3 milliLiter(s) Nebulizer every 6 hours  budesonide 160 MICROgram(s)/formoterol 4.5 MICROgram(s) Inhaler 2 Puff(s) Inhalation two times a day  chlorhexidine 2% Cloths 1 Application(s) Topical daily  enoxaparin Injectable 40 milliGRAM(s) SubCutaneous daily  predniSONE   Tablet 20 milliGRAM(s) Oral daily  tiotropium 18 MICROgram(s) Capsule 1 Capsule(s) Inhalation daily      Vital Signs Last 24 Hrs  T(C): 36.9 (09 Jul 2021 08:12), Max: 36.9 (08 Jul 2021 16:27)  T(F): 98.5 (09 Jul 2021 08:12), Max: 98.5 (08 Jul 2021 16:27)  HR: 76 (09 Jul 2021 08:12) (65 - 93)  BP: 107/64 (09 Jul 2021 08:12) (103/61 - 107/64)  BP(mean): --  RR: 18 (09 Jul 2021 08:12) (18 - 18)  SpO2: 98% (09 Jul 2021 08:12) (95% - 100%)          07-08 @ 07:01  -  07-09 @ 07:00  --------------------------------------------------------  IN: 920 mL / OUT: 0 mL / NET: 920 mL          LABS:                        11.6   8.46  )-----------( 175      ( 09 Jul 2021 07:39 )             36.9             Physical Examination:  PULM: decreased BS  CVS: S1, S2 heard    RADIOLOGY REVIEWED  CT chest: < from: CT Angio Chest PE Protocol w/ IV Cont (07.01.21 @ 16:35) >  Tubes/Lines: None    Mediastinum and Heart: Aorta and pulmonary arteries are normal in size. Thyroid gland is unremarkable. No lymphadenopathy. No pericardial effusion.    Lungs, Pleura, and Airways: There is no pulmonary embolus. Severe emphysema noted. There is architectural distortion and bronchiectasis in the left upper lobe, with improved aeration when compared with previous exam.    In the right lung there is a new 2.1 x 1.2 cm spiculated nodule on series 2 image 37.    There is complete atelectasis of the right middle lobe, unchanged since prior without associated endobronchial lesion.    Visualized Abdomen: Suprarenal 2.5 cm aortic aneurysm. Upper abdomen is otherwise unremarkable.    Bones and soft tissues: Unremarkable.    IMPRESSION:    No pulmonary embolus.      New 2.1 x 1.2 cm spiculated nodule in the right upper lobe when compared with 2016 examination. This finding is indeterminate but concerning for malignancy given the concurrent finding of severe emphysema. No associated lymphadenopathy.    Significant left upper lobe architectural distortion with improved aeration since the previous exam.    < end of copied text >
    SUBJECTIVE / OVERNIGHT EVENTS: pt seen and examined     MEDICATIONS  (STANDING):  albuterol/ipratropium for Nebulization 3 milliLiter(s) Nebulizer every 6 hours  budesonide 160 MICROgram(s)/formoterol 4.5 MICROgram(s) Inhaler 2 Puff(s) Inhalation two times a day  chlorhexidine 2% Cloths 1 Application(s) Topical daily  enoxaparin Injectable 40 milliGRAM(s) SubCutaneous daily  tiotropium 18 MICROgram(s) Capsule 1 Capsule(s) Inhalation daily    MEDICATIONS  (PRN):    Vital Signs Last 24 Hrs  T(C): 36.9 (06 Jul 2021 16:05), Max: 36.9 (06 Jul 2021 07:37)  T(F): 98.5 (06 Jul 2021 16:05), Max: 98.5 (06 Jul 2021 07:37)  HR: 77 (06 Jul 2021 16:05) (72 - 77)  BP: 121/66 (06 Jul 2021 16:05) (100/60 - 121/66)  BP(mean): --  RR: 18 (06 Jul 2021 16:05) (18 - 18)  SpO2: 99% (06 Jul 2021 16:05) (87% - 99%)      Constitutional: No fever, fatigue  Skin: No rash.  Eyes: No recent vision problems or eye pain.  ENT: No congestion, ear pain, or sore throat.  Cardiovascular: No chest pain or palpation.  Respiratory: No cough, shortness of breath, congestion, or wheezing.  Gastrointestinal: No abdominal pain, nausea, vomiting, or diarrhea.  Genitourinary: No dysuria.  Musculoskeletal: No joint swelling.  Neurologic: No headache.    PHYSICAL EXAM:  GENERAL: NAD  EYES: EOMI, PERRLA  NECK: Supple, No JVD  CHEST/LUNG: dec breath sounds rt base  HEART:  S1 , S2 +  ABDOMEN: soft , bs+  EXTREMITIES:  trace edema  NEUROLOGY:alert awake    LABS:  07-06    135  |  100  |  15  ----------------------------<  91  4.0   |  23  |  0.65    Ca    9.3      06 Jul 2021 06:30      Creatinine Trend: 0.65 <--, 0.64 <--, 0.67 <--                        11.3   5.08  )-----------( 189      ( 06 Jul 2021 06:31 )             35.9     Urine Studies:                
    SUBJECTIVE / OVERNIGHT EVENTS: pt seen and examined     MEDICATIONS  (STANDING):  albuterol/ipratropium for Nebulization 3 milliLiter(s) Nebulizer every 6 hours  budesonide 160 MICROgram(s)/formoterol 4.5 MICROgram(s) Inhaler 2 Puff(s) Inhalation two times a day  chlorhexidine 2% Cloths 1 Application(s) Topical daily  enoxaparin Injectable 40 milliGRAM(s) SubCutaneous daily  predniSONE   Tablet 20 milliGRAM(s) Oral daily  tiotropium 18 MICROgram(s) Capsule 1 Capsule(s) Inhalation daily    MEDICATIONS  (PRN):    Vital Signs Last 24 Hrs  T(C): 36.9 (08 Jul 2021 16:27), Max: 37.1 (07 Jul 2021 23:48)  T(F): 98.5 (08 Jul 2021 16:27), Max: 98.7 (07 Jul 2021 23:48)  HR: 93 (08 Jul 2021 16:27) (81 - 93)  BP: 105/72 (08 Jul 2021 16:27) (93/62 - 122/72)  BP(mean): --  RR: 18 (08 Jul 2021 16:27) (18 - 18)  SpO2: 99% (08 Jul 2021 16:27) (97% - 99%)    Constitutional: No fever, fatigue  Skin: No rash.  Eyes: No recent vision problems or eye pain.  ENT: No congestion, ear pain, or sore throat.  Cardiovascular: No chest pain or palpation.  Respiratory: No cough, shortness of breath, congestion, or wheezing.  Gastrointestinal: No abdominal pain, nausea, vomiting, or diarrhea.  Genitourinary: No dysuria.  Musculoskeletal: No joint swelling.  Neurologic: No headache.    PHYSICAL EXAM:  GENERAL: NAD  EYES: EOMI, PERRLA  NECK: Supple, No JVD  CHEST/LUNG: dec breath sounds rt base  HEART:  S1 , S2 +  ABDOMEN: soft , bs+  EXTREMITIES:  trace edema  NEUROLOGY:alert awake    LABS:        Creatinine Trend: 0.65 <--, 0.64 <--    Urine Studies:                    
Follow-up Pulm Progress Note    Endorses worsened WILKINSON today while walking with PT  Sats 98% 2LNC     Medications:  MEDICATIONS  (STANDING):  albuterol/ipratropium for Nebulization 3 milliLiter(s) Nebulizer every 6 hours  budesonide 160 MICROgram(s)/formoterol 4.5 MICROgram(s) Inhaler 2 Puff(s) Inhalation two times a day  chlorhexidine 2% Cloths 1 Application(s) Topical daily  enoxaparin Injectable 40 milliGRAM(s) SubCutaneous daily  predniSONE   Tablet 20 milliGRAM(s) Oral daily  tiotropium 18 MICROgram(s) Capsule 1 Capsule(s) Inhalation daily      Vital Signs Last 24 Hrs  T(C): 36.9 (07 Jul 2021 07:27), Max: 36.9 (06 Jul 2021 16:05)  T(F): 98.4 (07 Jul 2021 07:27), Max: 98.5 (06 Jul 2021 16:05)  HR: 88 (07 Jul 2021 10:45) (71 - 88)  BP: 103/63 (07 Jul 2021 10:45) (98/61 - 121/66)  BP(mean): --  RR: 18 (07 Jul 2021 07:27) (18 - 18)  SpO2: 98% (07 Jul 2021 10:45) (98% - 100%)          07-06 @ 07:01  -  07-07 @ 07:00  --------------------------------------------------------  IN: 640 mL / OUT: 0 mL / NET: 640 mL          LABS:                        11.3   5.08  )-----------( 189      ( 06 Jul 2021 06:31 )             35.9     07-06    135  |  100  |  15  ----------------------------<  91  4.0   |  23  |  0.65    Ca    9.3      06 Jul 2021 06:30          Physical Examination:  PULM: decreased BS  CVS: S1, S2 heard    RADIOLOGY REVIEWED  CT chest: < from: CT Angio Chest PE Protocol w/ IV Cont (07.01.21 @ 16:35) >  Tubes/Lines: None    Mediastinum and Heart: Aorta and pulmonary arteries are normal in size. Thyroid gland is unremarkable. No lymphadenopathy. No pericardial effusion.    Lungs, Pleura, and Airways: There is no pulmonary embolus. Severe emphysema noted. There is architectural distortion and bronchiectasis in the left upper lobe, with improved aeration when compared with previous exam.    In the right lung there is a new 2.1 x 1.2 cm spiculated nodule on series 2 image 37.    There is complete atelectasis of the right middle lobe, unchanged since prior without associated endobronchial lesion.    Visualized Abdomen: Suprarenal 2.5 cm aortic aneurysm. Upper abdomen is otherwise unremarkable.    Bones and soft tissues: Unremarkable.    IMPRESSION:    No pulmonary embolus.      New 2.1 x 1.2 cm spiculated nodule in the right upper lobe when compared with 2016 examination. This finding is indeterminate but concerning for malignancy given the concurrent finding of severe emphysema. No associated lymphadenopathy.    Significant left upper lobe architectural distortion with improved aeration since the previous exam.      < end of copied text >      TTE: < from: Transthoracic Echocardiogram (07.06.21 @ 12:59) >  ------------------------------------------------------------------------  Dimensions:    Normal Values:  LA:     2.6    2.0 - 4.0 cm  Ao:     2.4    2.0 - 3.8 cm  SEPTUM: 0.6    0.6 - 1.2 cm  PWT:    0.7    0.6 - 1.1 cm  LVIDd:  4.1    3.0 - 5.6 cm  LVIDs:  2.4    1.8 - 4.0 cm  Derived variables:  LVMI: 48 g/m2  RWT: 0.34  Fractional short: 41 %  EF (Hankins Rule): 63 %  ------------------------------------------------------------------------  Observations:  Mitral Valve: Mitral annular calcification, otherwise  normal mitral valve. Minimal mitral regurgitation.  Aortic Valve/Aorta: Normal trileaflet aortic valve.  Aortic Root: 2.4 cm.  Left Atrium: LA volume index = 11 cc/m2.  Left Ventricle: Normal left ventricular systolic function.  No segmental wall motion abnormalities. Normal left  ventricular internal dimensions and wall thicknesses.  Normal diastolic function  Right Heart: Normal right atrium. Normal right ventricular  size and function. Normaltricuspid valve. Mild tricuspid  regurgitation. Normal pulmonic valve.  Pericardium/Pleura: Normal pericardium with no pericardial  effusion.  Hemodynamic: Estimated right atrial pressure is 8 mm Hg.  Estimated right ventricular systolic pressure equals 35 mm  Hg, assuming right atrial pressure equals 8 mm Hg,  consistent with borderline pulmonary hypertension.  ------------------------------------------------------------------------  Conclusions:  1. Normal left ventricular internal dimensions and wall  thicknesses.  2. Normal left ventricular systolic function. No segmental  wall motion abnormalities.  3. Normal right ventricular size and function.    < end of copied text >    
    SUBJECTIVE / OVERNIGHT EVENTS: pt seen and examined     MEDICATIONS  (STANDING):  albuterol/ipratropium for Nebulization 3 milliLiter(s) Nebulizer every 6 hours  budesonide 160 MICROgram(s)/formoterol 4.5 MICROgram(s) Inhaler 2 Puff(s) Inhalation two times a day  chlorhexidine 2% Cloths 1 Application(s) Topical daily  enoxaparin Injectable 40 milliGRAM(s) SubCutaneous daily  predniSONE   Tablet 20 milliGRAM(s) Oral daily  tiotropium 18 MICROgram(s) Capsule 1 Capsule(s) Inhalation daily    MEDICATIONS  (PRN):    Vital Signs Last 24 Hrs  T(C): 36.9 (07 Jul 2021 16:01), Max: 36.9 (07 Jul 2021 07:27)  T(F): 98.5 (07 Jul 2021 16:01), Max: 98.5 (07 Jul 2021 16:01)  HR: 82 (07 Jul 2021 16:01) (71 - 88)  BP: 99/59 (07 Jul 2021 16:01) (98/61 - 112/71)  BP(mean): --  RR: 18 (07 Jul 2021 16:01) (18 - 18)  SpO2: 98% (07 Jul 2021 16:01) (98% - 100%)    Constitutional: No fever, fatigue  Skin: No rash.  Eyes: No recent vision problems or eye pain.  ENT: No congestion, ear pain, or sore throat.  Cardiovascular: No chest pain or palpation.  Respiratory: No cough, shortness of breath, congestion, or wheezing.  Gastrointestinal: No abdominal pain, nausea, vomiting, or diarrhea.  Genitourinary: No dysuria.  Musculoskeletal: No joint swelling.  Neurologic: No headache.    PHYSICAL EXAM:  GENERAL: NAD  EYES: EOMI, PERRLA  NECK: Supple, No JVD  CHEST/LUNG: dec breath sounds rt base  HEART:  S1 , S2 +  ABDOMEN: soft , bs+  EXTREMITIES:  trace edema  NEUROLOGY:alert awake    LABS:  07-06    135  |  100  |  15  ----------------------------<  91  4.0   |  23  |  0.65    Ca    9.3      06 Jul 2021 06:30      Creatinine Trend: 0.65 <--, 0.64 <--, 0.67 <--                        11.3   5.08  )-----------( 189      ( 06 Jul 2021 06:31 )             35.9     Urine Studies:                
Follow-up Pulm Progress Note    Started on prednisone yesterday - pt states she is unsure if is helping her breathing as she has not ambulated far. Offered to ambulate with pt in hallway to assess response, pt states she rather wait for PT today.   Denies CP  Sats 97% 2LNC at rest     Medications:  MEDICATIONS  (STANDING):  albuterol/ipratropium for Nebulization 3 milliLiter(s) Nebulizer every 6 hours  budesonide 160 MICROgram(s)/formoterol 4.5 MICROgram(s) Inhaler 2 Puff(s) Inhalation two times a day  chlorhexidine 2% Cloths 1 Application(s) Topical daily  enoxaparin Injectable 40 milliGRAM(s) SubCutaneous daily  predniSONE   Tablet 20 milliGRAM(s) Oral daily  tiotropium 18 MICROgram(s) Capsule 1 Capsule(s) Inhalation daily        Vital Signs Last 24 Hrs  T(C): 36.9 (08 Jul 2021 07:25), Max: 37.1 (07 Jul 2021 23:48)  T(F): 98.5 (08 Jul 2021 07:25), Max: 98.7 (07 Jul 2021 23:48)  HR: 81 (08 Jul 2021 07:25) (81 - 82)  BP: 122/72 (08 Jul 2021 07:25) (93/62 - 122/72)  BP(mean): --  RR: 18 (08 Jul 2021 07:25) (18 - 18)  SpO2: 97% (08 Jul 2021 07:25) (97% - 98%)          07-07 @ 07:01  -  07-08 @ 07:00  --------------------------------------------------------  IN: 300 mL / OUT: 0 mL / NET: 300 mL          Physical Examination:  PULM: decreased BS  CVS: S1, S2 heard    RADIOLOGY REVIEWED  CT chest: < from: CT Angio Chest PE Protocol w/ IV Cont (07.01.21 @ 16:35) >  Tubes/Lines: None    Mediastinum and Heart: Aorta and pulmonary arteries are normal in size. Thyroid gland is unremarkable. No lymphadenopathy. No pericardial effusion.    Lungs, Pleura, and Airways: There is no pulmonary embolus. Severe emphysema noted. There is architectural distortion and bronchiectasis in the left upper lobe, with improved aeration when compared with previous exam.    In the right lung there is a new 2.1 x 1.2 cm spiculated nodule on series 2 image 37.    There is complete atelectasis of the right middle lobe, unchanged since prior without associated endobronchial lesion.    Visualized Abdomen: Suprarenal 2.5 cm aortic aneurysm. Upper abdomen is otherwise unremarkable.    Bones and soft tissues: Unremarkable.    IMPRESSION:    No pulmonary embolus.      New 2.1 x 1.2 cm spiculated nodule in the right upper lobe when compared with 2016 examination. This finding is indeterminate but concerning for malignancy given the concurrent finding of severe emphysema. No associated lymphadenopathy.    Significant left upper lobe architectural distortion with improved aeration since the previous exam.    < end of copied text >    
    SUBJECTIVE / OVERNIGHT EVENTS:    MEDICATIONS  (STANDING):  albuterol/ipratropium for Nebulization 3 milliLiter(s) Nebulizer every 6 hours  budesonide 160 MICROgram(s)/formoterol 4.5 MICROgram(s) Inhaler 2 Puff(s) Inhalation two times a day  enoxaparin Injectable 40 milliGRAM(s) SubCutaneous daily  tiotropium 18 MICROgram(s) Capsule 1 Capsule(s) Inhalation daily    MEDICATIONS  (PRN):    Vital Signs Last 24 Hrs  T(C): 36.9 (04 Jul 2021 15:42), Max: 36.9 (04 Jul 2021 00:39)  T(F): 98.5 (04 Jul 2021 15:42), Max: 98.5 (04 Jul 2021 00:39)  HR: 84 (04 Jul 2021 15:42) (77 - 84)  BP: 101/65 (04 Jul 2021 15:42) (96/63 - 112/77)  BP(mean): --  RR: 16 (04 Jul 2021 15:42) (16 - 18)  SpO2: 97% (04 Jul 2021 15:42) (97% - 97%)    Constitutional: No fever, fatigue  Skin: No rash.  Eyes: No recent vision problems or eye pain.  ENT: No congestion, ear pain, or sore throat.  Cardiovascular: No chest pain or palpation.  Respiratory: No cough, shortness of breath, congestion, or wheezing.  Gastrointestinal: No abdominal pain, nausea, vomiting, or diarrhea.  Genitourinary: No dysuria.  Musculoskeletal: No joint swelling.  Neurologic: No headache.    PHYSICAL EXAM:  GENERAL: NAD  EYES: EOMI, PERRLA  NECK: Supple, No JVD  CHEST/LUNG: dec breath sounds rt base  HEART:  S1 , S2 +  ABDOMEN: soft , bs+  EXTREMITIES:  trace edema  NEUROLOGY:alert awake    LABS:        Creatinine Trend: 0.67 <--    Urine Studies:                
    SUBJECTIVE / OVERNIGHT EVENTS:      MEDICATIONS  (STANDING):  albuterol/ipratropium for Nebulization 3 milliLiter(s) Nebulizer every 6 hours  budesonide 160 MICROgram(s)/formoterol 4.5 MICROgram(s) Inhaler 2 Puff(s) Inhalation two times a day  enoxaparin Injectable 40 milliGRAM(s) SubCutaneous daily  tiotropium 18 MICROgram(s) Capsule 1 Capsule(s) Inhalation daily    MEDICATIONS  (PRN):        CAPILLARY BLOOD GLUCOSE        I&O's Summary    02 Jul 2021 07:01  -  03 Jul 2021 07:00  --------------------------------------------------------  IN: 780 mL / OUT: 200 mL / NET: 580 mL    03 Jul 2021 07:01  -  04 Jul 2021 00:40  --------------------------------------------------------  IN: 420 mL / OUT: 0 mL / NET: 420 mL        Constitutional: No fever, fatigue  Skin: No rash.  Eyes: No recent vision problems or eye pain.  ENT: No congestion, ear pain, or sore throat.  Cardiovascular: No chest pain or palpation.  Respiratory: No cough, shortness of breath, congestion, or wheezing.  Gastrointestinal: No abdominal pain, nausea, vomiting, or diarrhea.  Genitourinary: No dysuria.  Musculoskeletal: No joint swelling.  Neurologic: No headache.    PHYSICAL EXAM:  GENERAL: NAD  EYES: EOMI, PERRLA  NECK: Supple, No JVD  CHEST/LUNG: dec breath sounds rt base  HEART:  S1 , S2 +  ABDOMEN: soft , bs+  EXTREMITIES:  trace edema  NEUROLOGY:alert awake      LABS:                    RADIOLOGY & ADDITIONAL TESTS:    Imaging Personally Reviewed:    Consultant(s) Notes Reviewed:      Care Discussed with Consultants/Other Providers:

## 2021-07-09 NOTE — PROGRESS NOTE ADULT - PROBLEM SELECTOR PROBLEM 1
Pulmonary emphysema, unspecified emphysema type
Emphysema/COPD
Pulmonary emphysema, unspecified emphysema type
Emphysema/COPD
Emphysema/COPD
Pulmonary emphysema, unspecified emphysema type
Emphysema/COPD
Pulmonary emphysema, unspecified emphysema type
Pulmonary emphysema, unspecified emphysema type
Emphysema/COPD
Pulmonary emphysema, unspecified emphysema type
Emphysema/COPD
Pulmonary emphysema, unspecified emphysema type

## 2021-07-09 NOTE — PROGRESS NOTE ADULT - PROBLEM SELECTOR PROBLEM 3
Lung nodule
Chest pain
Chest pain
Lung nodule
Lung nodule
Chest pain
Lung nodule
Chest pain
Lung nodule

## 2021-07-09 NOTE — PROGRESS NOTE ADULT - PROBLEM SELECTOR PLAN 2
-CTA chest notes new 2.1 x 1.2 cm spiculated nodule in the right upper lobe when compared with 2016 examination  -Reached out to outpt pulm office (Dr. Browning @ [facility]), there are no CT scans in record for comparison  -Noted to have output f/u @ [facility] scheduled for 7/19  -Discussed the new possibly malignant nodule with pt. Talked about a possible biopsy if she would consider non surgical therapy since due to her underlying severe emphysema she is not a surgical candidate. States she would likely consider biopsy as an outpatient over the next month
- worse with inspiration  - CTA chest negative for PE but shows a new spiculated RUL mass ; concerning for malignancy  -
-CTA chest notes new 2.1 x 1.2 cm spiculated nodule in the right upper lobe when compared with 2016 examination  -Reached out to outpt pulm office (Dr. Browning @ [facility]), there are no CT scans in record for comparison  -Noted to have output f/u @ [facility] scheduled for 7/19  -Discussed the new possibly malignant nodule with pt. Talked about a possible biopsy if she would consider non surgical therapy since due to her underlying severe emphysema she is not a surgical candidate. States she would likely consider biopsy as an outpatient over the next month
- worse with inspiration  - CTA chest negative for PE but shows a new spiculated RUL mass ; concerning for malignancy  -
-CTA chest notes new 2.1 x 1.2 cm spiculated nodule in the right upper lobe when compared with 2016 examination  -Reached out to outpt pulm office (Dr. Browning @ [facility]), there are no CT scans in record for comparison  -Noted to have output f/u @ [facility] scheduled for 7/19  -Discussed the new possibly malignant nodule with pt. Talked about a possible biopsy if she would consider non surgical therapy since due to her underlying severe emphysema she is not a surgical candidate. States she would likely consider biopsy as an outpatient over the next month
-CTA chest notes new 2.1 x 1.2 cm spiculated nodule in the right upper lobe when compared with 2016 examination  -Reached out to outpt pulm office (Dr. Browning @ [facility]), there are no CT scans in record for comparison  -Noted to have output f/u @ [facility] scheduled for 7/19  -Discussed the new possibly malignant nodule with pt. Talked about a possible biopsy if she would consider non surgical therapy since due to her underlying severe emphysema she is not a surgical candidate. States she would likely consider biopsy as an outpatient over the next month
- worse with inspiration  - CTA chest negative for PE but shows a new spiculated RUL mass ; concerning for malignancy  -
- worse with inspiration  - CTA chest negative for PE but shows a new spiculated RUL mass ; concerning for malignancy  -
-CTA chest notes new 2.1 x 1.2 cm spiculated nodule in the right upper lobe when compared with 2016 examination  -Reached out to outpt pulm office (Dr. Browning @ [facility]), there are no CT scans in record for comparison  -Noted to have output f/u @ [facility] scheduled for 7/19
-CTA chest notes new 2.1 x 1.2 cm spiculated nodule in the right upper lobe when compared with 2016 examination  -Reached out to outpt pulm office (Dr. Browning @ [facility]), there are no CT scans in record for comparison  -Noted to have output f/u @ [facility] scheduled for 7/19  -Discussed the new possibly malignant nodule with pt. Talked about a possible biopsy if she would consider non surgical therapy since due to her underlying severe emphysema she is not a surgical candidate. States she would likely consider biopsy as an outpatient over the next month.
- worse with inspiration  - CTA chest negative for PE but shows a new spiculated RUL mass ; concerning for malignancy  -

## 2021-07-09 NOTE — PROGRESS NOTE ADULT - REASON FOR ADMISSION
Chest pain, SOB

## 2021-07-09 NOTE — PROGRESS NOTE ADULT - PROBLEM SELECTOR PLAN 4
endorses intermittent LE edema/L knee swelling  -improving  -LE duplex neg DVT
endorses intermittent LE edema/L knee swelling  -improving  -LE duplex neg DVT
- reports having b/l LE edema up to the knees over the past week w/associated LE tingling  - now resolved  - f/up LE doppler
endorses intermittent LE edema/L knee swelling  -improving  -LE duplex neg DVT
endorses intermittent LE edema/L knee swelling  -improving  -LE duplex neg DVT
- reports having b/l LE edema up to the knees over the past week w/associated LE tingling  - now resolved  - f/up LE doppler
- reports having b/l LE edema up to the knees over the past week w/associated LE tingling  - now resolved  - f/up LE doppler
endorses intermittent LE edema/L knee swelling  -F/u LE duplex
- reports having b/l LE edema up to the knees over the past week w/associated LE tingling  - now resolved  - f/up LE doppler
endorses intermittent LE edema/L knee swelling  -LE duplex neg DVT
- reports having b/l LE edema up to the knees over the past week w/associated LE tingling  - now resolved  - f/up LE doppler

## 2021-07-09 NOTE — PROGRESS NOTE ADULT - PROBLEM SELECTOR PROBLEM 2
Lung nodule
Chest pain on breathing
Lung nodule
Chest pain on breathing
Chest pain on breathing
Lung nodule
Chest pain on breathing
Lung nodule
Chest pain on breathing

## 2021-07-09 NOTE — PROGRESS NOTE ADULT - ASSESSMENT
70F with h/o emphysema/COPD, Asthma (on home O2 3LNC) who presents with c/o  Left sided CP worse with inhalation and worsening SOB. Found to have new lung mass on imaging.
69 y/o F with PMH emphysema/COPD, asthma (on 3LNC). Presents with sharp L sided CP worse with inhalation and worsening SOB. States has not felt well for the past week, endorses L knee swelling, which has now improved. CXR with no clear infiltrate. S/p solumedrol 125mg IVP x1 in ED. No wheezing on exam, at baseline O2 requirements. 
71 y/o F with PMH emphysema/COPD, asthma (on 3LNC). Presents with sharp L sided CP worse with inhalation and worsening SOB. States has not felt well for the past week, endorses L knee swelling, which has now improved. CXR with no clear infiltrate. S/p solumedrol 125mg IVP x1 in ED. No wheezing on exam, at baseline O2 requirements. 
70F with h/o emphysema/COPD, Asthma (on home O2 3LNC) who presents with c/o  Left sided CP worse with inhalation and worsening SOB. Found to have new lung mass on imaging.
71 y/o F with PMH emphysema/COPD, asthma (on 3LNC). Presents with sharp L sided CP worse with inhalation and worsening SOB. States has not felt well for the past week, endorses L knee swelling, which has now improved. CXR with no clear infiltrate. S/p solumedrol 125mg IVP x1 in ED. No wheezing on exam, at baseline O2 requirements. 
71 y/o F with PMH emphysema/COPD, asthma (on 3LNC). Presents with sharp L sided CP worse with inhalation and worsening SOB. States has not felt well for the past week, endorses L knee swelling, which has now improved. CXR with no clear infiltrate. S/p solumedrol 125mg IVP x1 in ED. No wheezing on exam, at baseline O2 requirements. 
70F with h/o emphysema/COPD, Asthma (on home O2 3LNC) who presents with c/o  Left sided CP worse with inhalation and worsening SOB. Found to have new lung mass on imaging.
71 y/o F with PMH emphysema/COPD, asthma (on 3LNC). Presents with sharp L sided CP worse with inhalation and worsening SOB. States has not felt well for the past week, endorses L knee swelling, which has now improved. CXR with no clear infiltrate. S/p solumedrol 125mg IVP x1 in ED. No wheezing on exam, at baseline O2 requirements. 
70F with h/o emphysema/COPD, Asthma (on home O2 3LNC) who presents with c/o  Left sided CP worse with inhalation and worsening SOB. Found to have new lung mass on imaging.
71 y/o F with PMH emphysema/COPD, asthma (on 3LNC). Presents with sharp L sided CP worse with inhalation and worsening SOB. States has not felt well for the past week, endorses L knee swelling, which has now improved. CXR with no clear infiltrate. S/p solumedrol 125mg IVP x1 in ED. No wheezing on exam, at baseline O2 requirements. 
70F with h/o emphysema/COPD, Asthma (on home O2 3LNC) who presents with c/o  Left sided CP worse with inhalation and worsening SOB. Found to have new lung mass on imaging.

## 2021-07-09 NOTE — PROGRESS NOTE ADULT - PROBLEM SELECTOR PLAN 1
-Breathing comfortably on O2 3LNC (baseline @ home), endorses WILKINSON  -S/p solumedrol 125mg IVP x1 in ED  -C/w Symbicort 160mcg/4.5mcg 2 puffs BID  -C/w Spiriva 18mcg 1 capsule inhaled qd  -C/w Duoneb q6h  -7/1 ABG noted 7.34/45/121/24  -CTA chest with severe emphysema  -Endorses slightly more WILKINSON than usual. Pt has been refusing starting steroids, she is now willing to try a low dose of prednisone. Prednisone 20mg PO qd started 7/7.  -Pt c/o anxiety with prednisone. Decrease to prednisone 15mg PO qd x 2 more days then stop.  -Pt refusing REECE. D/c planning home with PT. Outpt referral to pulmonary rehab - d/w case management.

## 2023-01-12 NOTE — PHYSICAL THERAPY INITIAL EVALUATION ADULT - PERTINENT HX OF CURRENT PROBLEM, REHAB EVAL
Pt is a 71 y/o F w/ PMH of emphysema/COPD, Asthma (on home O2 3LNC) who presented with c/o sharp L sided CP worse with inhalation and worsening SOB. She stated that she has not been feeling well for the past week. She reported L knee swelling, which has now improved. She denied cough, wheezing, fever/chills. Was A Bandage Applied: Yes

## 2023-02-10 NOTE — ED ADULT NURSE NOTE - NS ED NURSE TRANSPORT WITH
Pt c/o Headache, congestion, x 10 days. Pt states taking sinus meds but not helping, 2/10 pain. Per protocol, see today or tomorrow in office. Attempted to schedule appt for pt, unable to schedule within timeframe. Advised ED/UC. Pt verbalizes understanding to call back for any further questions or concerns.   Reason for Disposition   Sinus congestion (pressure, fullness) present > 10 days    Additional Information   Negative: Sounds like a life-threatening emergency to the triager   Negative: Difficulty breathing, and not from stuffy nose (e.g., not relieved by cleaning out the nose)   Negative: SEVERE headache and has fever   Negative: Patient sounds very sick or weak to the triager   Negative: SEVERE sinus pain   Negative: Severe headache   Negative: Redness or swelling on the cheek, forehead, or around the eye   Negative: Fever > 103 F (39.4 C)   Negative: Fever > 101 F (38.3 C) and over 60 years of age   Negative: Fever > 100.0 F (37.8 C) and has diabetes mellitus or a weak immune system (e.g., HIV positive, cancer chemotherapy, organ transplant, splenectomy, chronic steroids)   Negative: Fever > 100.0 F (37.8 C) and bedridden (e.g., nursing home patient, stroke, chronic illness, recovering from surgery)   Negative: Fever present > 3 days (72 hours)   Negative: Fever returns after gone for over 24 hours and symptoms worse or not improved   Negative: Sinus pain (not just congestion) and fever   Negative: Earache    Protocols used: Sinus Pain and Congestion-A-[place]     oxygen
